# Patient Record
Sex: MALE | Race: NATIVE HAWAIIAN OR OTHER PACIFIC ISLANDER | Employment: FULL TIME | URBAN - METROPOLITAN AREA
[De-identification: names, ages, dates, MRNs, and addresses within clinical notes are randomized per-mention and may not be internally consistent; named-entity substitution may affect disease eponyms.]

---

## 2017-04-12 ENCOUNTER — OFFICE VISIT (OUTPATIENT)
Dept: FAMILY MEDICINE | Facility: CLINIC | Age: 37
End: 2017-04-12
Payer: MEDICAID

## 2017-04-12 VITALS
TEMPERATURE: 97.6 F | HEIGHT: 66 IN | DIASTOLIC BLOOD PRESSURE: 80 MMHG | BODY MASS INDEX: 30.7 KG/M2 | WEIGHT: 191 LBS | SYSTOLIC BLOOD PRESSURE: 134 MMHG | HEART RATE: 64 BPM

## 2017-04-12 DIAGNOSIS — E78.5 HYPERLIPIDEMIA LDL GOAL <130: ICD-10-CM

## 2017-04-12 DIAGNOSIS — E11.9 TYPE 2 DIABETES MELLITUS WITHOUT COMPLICATION, WITHOUT LONG-TERM CURRENT USE OF INSULIN (H): ICD-10-CM

## 2017-04-12 DIAGNOSIS — J30.2 SEASONAL ALLERGIC RHINITIS, UNSPECIFIED ALLERGIC RHINITIS TRIGGER: ICD-10-CM

## 2017-04-12 DIAGNOSIS — I10 HYPERTENSION GOAL BP (BLOOD PRESSURE) < 140/80: ICD-10-CM

## 2017-04-12 DIAGNOSIS — Z00.00 ROUTINE GENERAL MEDICAL EXAMINATION AT A HEALTH CARE FACILITY: Primary | ICD-10-CM

## 2017-04-12 DIAGNOSIS — E55.9 VITAMIN D DEFICIENCY: ICD-10-CM

## 2017-04-12 DIAGNOSIS — Z13.89 SCREENING FOR DIABETIC PERIPHERAL NEUROPATHY: ICD-10-CM

## 2017-04-12 LAB
ALBUMIN SERPL-MCNC: 3.6 G/DL (ref 3.4–5)
ALBUMIN UR-MCNC: NEGATIVE MG/DL
ALP SERPL-CCNC: 71 U/L (ref 40–150)
ALT SERPL W P-5'-P-CCNC: 21 U/L (ref 0–70)
ANION GAP SERPL CALCULATED.3IONS-SCNC: 7 MMOL/L (ref 3–14)
APPEARANCE UR: CLEAR
AST SERPL W P-5'-P-CCNC: 12 U/L (ref 0–45)
BILIRUB SERPL-MCNC: 0.3 MG/DL (ref 0.2–1.3)
BILIRUB UR QL STRIP: NEGATIVE
BUN SERPL-MCNC: 13 MG/DL (ref 7–30)
CALCIUM SERPL-MCNC: 9.1 MG/DL (ref 8.5–10.1)
CHLORIDE SERPL-SCNC: 108 MMOL/L (ref 94–109)
CHOLEST SERPL-MCNC: 149 MG/DL
CO2 SERPL-SCNC: 27 MMOL/L (ref 20–32)
COLOR UR AUTO: YELLOW
CREAT SERPL-MCNC: 0.8 MG/DL (ref 0.66–1.25)
CREAT UR-MCNC: 182 MG/DL
GFR SERPL CREATININE-BSD FRML MDRD: ABNORMAL ML/MIN/1.7M2
GLUCOSE SERPL-MCNC: 150 MG/DL (ref 70–99)
GLUCOSE UR STRIP-MCNC: NEGATIVE MG/DL
HBA1C MFR BLD: 6.6 % (ref 4.3–6)
HDLC SERPL-MCNC: 42 MG/DL
HGB UR QL STRIP: NEGATIVE
KETONES UR STRIP-MCNC: NEGATIVE MG/DL
LDLC SERPL CALC-MCNC: 89 MG/DL
LEUKOCYTE ESTERASE UR QL STRIP: NEGATIVE
MICROALBUMIN UR-MCNC: 10 MG/L
MICROALBUMIN/CREAT UR: 5.77 MG/G CR (ref 0–17)
NITRATE UR QL: NEGATIVE
NONHDLC SERPL-MCNC: 107 MG/DL
PH UR STRIP: 5.5 PH (ref 5–7)
POTASSIUM SERPL-SCNC: 4.1 MMOL/L (ref 3.4–5.3)
PROT SERPL-MCNC: 7.7 G/DL (ref 6.8–8.8)
SODIUM SERPL-SCNC: 142 MMOL/L (ref 133–144)
SP GR UR STRIP: 1.02 (ref 1–1.03)
TRIGL SERPL-MCNC: 90 MG/DL
URN SPEC COLLECT METH UR: NORMAL
UROBILINOGEN UR STRIP-ACNC: 0.2 EU/DL (ref 0.2–1)

## 2017-04-12 PROCEDURE — 82043 UR ALBUMIN QUANTITATIVE: CPT | Performed by: FAMILY MEDICINE

## 2017-04-12 PROCEDURE — 83036 HEMOGLOBIN GLYCOSYLATED A1C: CPT | Performed by: FAMILY MEDICINE

## 2017-04-12 PROCEDURE — 99395 PREV VISIT EST AGE 18-39: CPT | Mod: 25 | Performed by: FAMILY MEDICINE

## 2017-04-12 PROCEDURE — 80061 LIPID PANEL: CPT | Performed by: FAMILY MEDICINE

## 2017-04-12 PROCEDURE — 99212 OFFICE O/P EST SF 10 MIN: CPT | Mod: 25 | Performed by: FAMILY MEDICINE

## 2017-04-12 PROCEDURE — 82306 VITAMIN D 25 HYDROXY: CPT | Performed by: FAMILY MEDICINE

## 2017-04-12 PROCEDURE — 99207 C FOOT EXAM  NO CHARGE: CPT | Mod: 25 | Performed by: FAMILY MEDICINE

## 2017-04-12 PROCEDURE — 80053 COMPREHEN METABOLIC PANEL: CPT | Performed by: FAMILY MEDICINE

## 2017-04-12 PROCEDURE — 81003 URINALYSIS AUTO W/O SCOPE: CPT | Performed by: FAMILY MEDICINE

## 2017-04-12 PROCEDURE — 36415 COLL VENOUS BLD VENIPUNCTURE: CPT | Performed by: FAMILY MEDICINE

## 2017-04-12 NOTE — MR AVS SNAPSHOT
After Visit Summary   4/12/2017    Abdi Ndiaye    MRN: [id number]           Patient Information     Date Of Birth          1980        Visit Information        Provider Department      4/12/2017 9:00 AM Isac Hare MD [facility]        Today's Diagnoses     Routine general medical examination at a health care facility    -  1    Screening for diabetic peripheral neuropathy        Hypertension goal BP (blood pressure) < 140/80        Type 2 diabetes mellitus without complication, without long-term current use of insulin (H)        Vitamin D deficiency        Seasonal allergic rhinitis, unspecified allergic rhinitis trigger        Hyperlipidemia LDL goal <130          Care Instructions      Preventive Health Recommendations  Male Ages 26 - 39    Yearly exam:             See your health care provider every year in order to  o   Review health changes.   o   Discuss preventive care.    o   Review your medicines if your doctor has prescribed any.    You should be tested each year for STDs (sexually transmitted diseases), if you re at risk.     After age 35, talk to your provider about cholesterol testing. If you are at risk for heart disease, have your cholesterol tested at least every 5 years.     If you are at risk for diabetes, you should have a diabetes test (fasting glucose).  Shots: Get a flu shot each year. Get a tetanus shot every 10 years.     Nutrition:    Eat at least 5 servings of fruits and vegetables daily.     Eat whole-grain bread, whole-wheat pasta and brown rice instead of white grains and rice.     Talk to your provider about Calcium and Vitamin D.     Lifestyle    Exercise for at least 150 minutes a week (30 minutes a day, 5 days a week). This will help you control your weight and prevent disease.     Limit alcohol to one drink per day.     No smoking.     Wear sunscreen to prevent skin cancer.     See your dentist every six months for an exam and cleaning.  "          Follow-ups after your visit        Who to contact     If you have questions or need follow up information about today's clinic visit or your schedule please contact [facility] [place] directly at [phone].  Normal or non-critical lab and imaging results will be communicated to you by MyChart, letter or phone within 4 business days after the clinic has received the results. If you do not hear from us within 7 days, please contact the clinic through MyChart or phone. If you have a critical or abnormal lab result, we will notify you by phone as soon as possible.  Submit refill requests through avandeo or call your pharmacy and they will forward the refill request to us. Please allow 3 business days for your refill to be completed.          Additional Information About Your Visit        Care EveryWhere ID     This is your Care EveryWhere ID. This could be used by other organizations to access your [facility] medical records  [id number]        Your Vitals Were     Pulse Temperature Height BMI (Body Mass Index)          64 97.6  F (36.4  C) (Tympanic) 5' 6\" (1.676 m) 30.83 kg/m2         Blood Pressure from Last 3 Encounters:   04/12/17 134/80   10/28/15 (!) 138/96   09/23/15 118/80    Weight from Last 3 Encounters:   04/12/17 191 lb (86.6 kg)   10/28/15 189 lb (85.7 kg)   09/23/15 186 lb (84.4 kg)              We Performed the Following     Albumin Random Urine Quantitative     Comprehensive metabolic panel     FOOT EXAM  NO CHARGE [26177.114]     HEMOGLOBIN A1C     Lipid panel reflex to direct LDL     UA reflex to Microscopic     Vitamin D Deficiency          Today's Medication Changes          These changes are accurate as of: 4/12/17  9:36 AM.  If you have any questions, ask your nurse or doctor.               Start taking these medicines.        Dose/Directions    metFORMIN 500 MG tablet   Commonly known as:  GLUCOPHAGE   Used for:  Type 2 diabetes mellitus without complication, without " long-term current use of insulin (H)   Started by:  Isac Hare MD        Dose:  500 mg   Take 1 tablet (500 mg) by mouth 2 times daily (with meals)   Quantity:  60 tablet   Refills:  5            Where to get your medicines      These medications were sent to [facility] [place] - [place], [place] - [address]  [address], [place] [address]     Phone:  [phone]     metFORMIN 500 MG tablet                Primary Care Provider Office Phone # Fax #    Isac Hare [phone]       [facility] [address] [place] [address] [address]  [place] [address]        Thank you!     Thank you for choosing [facility]  for your care. Our goal is always to provide you with excellent care. Hearing back from our patients is one way we can continue to improve our services. Please take a few minutes to complete the written survey that you may receive in the mail after your visit with us. Thank you!             Your Updated Medication List - Protect others around you: Learn how to safely use, store and throw away your medicines at www.disposemymeds.org.          This list is accurate as of: 4/12/17  9:36 AM.  Always use your most recent med list.                   Brand Name Dispense Instructions for use    metFORMIN 500 MG tablet    GLUCOPHAGE    60 tablet    Take 1 tablet (500 mg) by mouth 2 times daily (with meals)

## 2017-04-12 NOTE — LETTER
[facility]   [address] [place] [address]   [phone]      April 13, 2017      Abdi Ndiaye  [address]  [place] [address]            Dear Abdi,      I have reviewed your recent labs. Here are the results:     -Liver and gallbladder tests (ALT,AST, Alk phos,bilirubin) are normal.   -Kidney function (GFR) is normal.   -Sodium is normal.   -Potassium is normal.       -Cholesterol levels (LDL,HDL, Triglycerides) are normal.  ADVISE: rechecking in 1 year.   -A1C test (average blood sugar the last 2-3 months) is above your goal.   ADVISE:  Diabetic followup appointment in 4 weeks. Please check and record your blood sugars at least 2 times daily for 1 week prior to your appointment and bring for review.  Also, recheck your A1C in 3 months too. (A1C, DX: diabetes)     -Vitamin D level is low and oral supplementation should be started.  ADVISE: starting over the counter Vitamin D3  2000 IU - 3 tabs (6000 IU) daily for 6 weeks and then 2000 IU daily to maintain levels.  In 2 months, please schedule a lab only appointment to recheck Vitamin D levels    -Microalbumin (urine protein) test is normal.  ADVISE: recheck annually     If you have any questions or concerns, please call the clinic at [phone] and make a follow up appointment with me.    Results for orders placed or performed in visit on 04/12/17   HEMOGLOBIN A1C   Result Value Ref Range    Hemoglobin A1C 6.6 (H) 4.3 - 6.0 %   Lipid panel reflex to direct LDL   Result Value Ref Range    Cholesterol 149 <200 mg/dL    Triglycerides 90 <150 mg/dL    HDL Cholesterol 42 >39 mg/dL    LDL Cholesterol Calculated 89 <100 mg/dL    Non HDL Cholesterol 107 <130 mg/dL   Albumin Random Urine Quantitative   Result Value Ref Range    Creatinine Urine 182 mg/dL    Albumin Urine mg/L 10 mg/L    Albumin Urine mg/g Cr 5.77 0 - 17 mg/g Cr   Comprehensive metabolic panel   Result Value Ref Range    Sodium 142 133 - 144 mmol/L     Potassium 4.1 3.4 - 5.3 mmol/L    Chloride 108 94 - 109 mmol/L    Carbon Dioxide 27 20 - 32 mmol/L    Anion Gap 7 3 - 14 mmol/L    Glucose 150 (H) 70 - 99 mg/dL    Urea Nitrogen 13 7 - 30 mg/dL    Creatinine 0.80 0.66 - 1.25 mg/dL    GFR Estimate >90  Non  GFR Calc   >60 mL/min/1.7m2    GFR Estimate If Black >90   GFR Calc   >60 mL/min/1.7m2    Calcium 9.1 8.5 - 10.1 mg/dL    Bilirubin Total 0.3 0.2 - 1.3 mg/dL    Albumin 3.6 3.4 - 5.0 g/dL    Protein Total 7.7 6.8 - 8.8 g/dL    Alkaline Phosphatase 71 40 - 150 U/L    ALT 21 0 - 70 U/L    AST 12 0 - 45 U/L   Vitamin D Deficiency   Result Value Ref Range    Vitamin D Deficiency screening 14 (L) 20 - 75 ug/L   UA reflex to Microscopic   Result Value Ref Range    Color Urine Yellow     Appearance Urine Clear     Glucose Urine Negative NEG mg/dL    Bilirubin Urine Negative NEG    Ketones Urine Negative NEG mg/dL    Specific Gravity Urine 1.025 1.003 - 1.035    Blood Urine Negative NEG    pH Urine 5.5 5.0 - 7.0 pH    Protein Albumin Urine Negative NEG mg/dL    Urobilinogen Urine 0.2 0.2 - 1.0 EU/dL    Nitrite Urine Negative NEG    Leukocyte Esterase Urine Negative NEG    Source Midstream Urine        Sincerely,    Isac Hare M.D.

## 2017-04-12 NOTE — NURSING NOTE
"Chief Complaint   Patient presents with     Physical     Fasting        Initial /80  Pulse 64  Temp 97.6  F (36.4  C) (Tympanic)  Ht 5' 6\" (1.676 m)  Wt 191 lb (86.6 kg)  BMI 30.83 kg/m2 Estimated body mass index is 30.83 kg/(m^2) as calculated from the following:    Height as of this encounter: 5' 6\" (1.676 m).    Weight as of this encounter: 191 lb (86.6 kg).  Medication Reconciliation: complete    Current Outpatient Prescriptions   Medication Sig Dispense Refill     [DISCONTINUED] ORDER FOR DME COUNTOUR NEXT BLOOD STRIPS    Use to test blood sugars 2x daily or as directed 100 strip prn       Zoran VAZQUEZ CMA  "

## 2017-04-12 NOTE — PROGRESS NOTES
SUBJECTIVE:     CC: Abdi Ndiaye is an 37 year old male who presents for preventative health visit.     Healthy Habits:    Do you get at least three servings of calcium containing foods daily (dairy, green leafy vegetables, etc.)? yes    Amount of exercise or daily activities, outside of work: NONE    Problems taking medications regularly not applicable    Medication side effects: No    Have you had an eye exam in the past two years? no    Do you see a dentist twice per year? yes    Do you have sleep apnea, excessive snoring or daytime drowsiness?no        He has history of diabetes but its diet controled. He was in [place] for [place] year with his children's. Denies nay medical issue while back in [place].  Some time complains of headache.  Over all feels good.    Today's PHQ-2 Score:   PHQ-2 ( 1999 [employer]) 4/12/2017 10/28/2015   Q1: Little interest or pleasure in doing things 0 0   Q2: Feeling down, depressed or hopeless 0 0   PHQ-2 Score 0 0       Abuse: Current or Past(Physical, Sexual or Emotional)- No  Do you feel safe in your environment - Yes    Social History   Substance Use Topics     Smoking status: Never Smoker     Smokeless tobacco: Never Used     Alcohol use No     The patient does not drink >3 drinks per day nor >7 drinks per week.    Last PSA: No results found for: PSA    Recent Labs   Lab Test  12/10/14   0907  08/08/14   1519   CHOL  165  143   HDL  43  45   LDL  99  79   TRIG  116  97   CHOLHDLRATIO  3.8  3.2       Reviewed orders with patient. Reviewed health maintenance and updated orders accordingly - Yes    Reviewed and updated as needed this visit by clinical staff  Tobacco  Allergies  Meds  Soc Hx        Reviewed and updated as needed this visit by Provider          ROS:  C: NEGATIVE for fever, chills, change in weight  I: NEGATIVE for worrisome rashes, moles or lesions  E: NEGATIVE for vision changes or irritation  ENT: NEGATIVE for ear, mouth and throat problems  R: NEGATIVE for significant  "cough or SOB  CV: NEGATIVE for chest pain, palpitations or peripheral edema  GI: NEGATIVE for nausea, abdominal pain, heartburn, or change in bowel habits   male: negative for dysuria, hematuria, decreased urinary stream, erectile dysfunction, urethral discharge  M: NEGATIVE for significant arthralgias or myalgia  N: NEGATIVE for weakness, dizziness or paresthesias  P: NEGATIVE for changes in mood or affect    Problem list, Medication list, Allergies, and Medical/Social/Surgical histories reviewed in [facility] and updated as appropriate.  Labs reviewed in EPIC  BP Readings from Last 3 Encounters:   04/12/17 134/80   10/28/15 (!) 138/96   09/23/15 118/80    Wt Readings from Last 3 Encounters:   04/12/17 191 lb (86.6 kg)   10/28/15 189 lb (85.7 kg)   09/23/15 186 lb (84.4 kg)                  Patient Active Problem List   Diagnosis     Nonspecific reaction to tuberculin skin test without active tuberculosis     Chronic rhinitis     Vitamin D deficiency     Seasonal allergic rhinitis     Hypertension goal BP (blood pressure) < 140/80     Type 2 diabetes mellitus without complication, without long-term current use of insulin (H)     Past Surgical History:   Procedure Laterality Date     NO HISTORY OF SURGERY         Social History   Substance Use Topics     Smoking status: Never Smoker     Smokeless tobacco: Never Used     Alcohol use No     Family History   Problem Relation Age of Onset     C.A.D. No family hx of      DIABETES No family hx of      Hypertension No family hx of      Cancer - colorectal No family hx of      Prostate Cancer No family hx of      Eye Disorder No family hx of      Thyroid Disease No family hx of      Anesthesia Reaction No family hx of      Blood Disease No family hx of          OBJECTIVE:     /80  Pulse 64  Temp 97.6  F (36.4  C) (Tympanic)  Ht 5' 6\" (1.676 m)  Wt 191 lb (86.6 kg)  BMI 30.83 kg/m2  EXAM:  GENERAL: healthy, alert and no distress  EYES: Eyes grossly normal to " inspection, PERRL and conjunctivae and sclerae normal  HENT: ear canals and TM's normal, nose and mouth without ulcers or lesions  NECK: no adenopathy, no asymmetry, masses, or scars and thyroid normal to palpation  RESP: lungs clear to auscultation - no rales, rhonchi or wheezes  CV: regular rate and rhythm, normal S1 S2, no S3 or S4, no murmur, click or rub, no peripheral edema and peripheral pulses strong  ABDOMEN: soft, nontender, no hepatosplenomegaly, no masses and bowel sounds normal  MS: no gross musculoskeletal defects noted, no edema  SKIN: no suspicious lesions or rashes  NEURO: Normal strength and tone, mentation intact and speech normal  PSYCH: mentation appears normal, affect normal/bright    ASSESSMENT/PLAN:     1. Routine general medical examination at a health care facility    - FOOT EXAM  NO CHARGE [85546.114]  - HEMOGLOBIN A1C  - Lipid panel reflex to direct LDL  - Albumin Random Urine Quantitative  - Comprehensive metabolic panel  - Vitamin D Deficiency  - UA reflex to Microscopic    2. Screening for diabetic peripheral neuropathy    - FOOT EXAM  NO CHARGE [04252.114]    3. Hypertension goal BP (blood pressure) < 140/80      4. Type 2 diabetes mellitus without complication, without long-term current use of insulin (H)  Patient AIC is elevated now at 6.6, we dicussed low dose of metformin 500 mg BID, take 1 tab for the first 1 week. Eat healthy, Avoid carb's. Loose some weight. Follow up in 3 months for recheck, we will reevaluate and see if any dose adjustments are needed.  - FOOT EXAM  NO CHARGE [05563.114]  - HEMOGLOBIN A1C  - Comprehensive metabolic panel  - UA reflex to Microscopic  - metFORMIN (GLUCOPHAGE) 500 MG tablet; Take 1 tablet (500 mg) by mouth 2 times daily (with meals)  Dispense: 60 tablet; Refill: 5    5. Vitamin D deficiency    - Vitamin D Deficiency    6. Seasonal allergic rhinitis, unspecified allergic rhinitis trigger      7. Hyperlipidemia LDL goal <130    - Lipid panel  "reflex to direct LDL  - Comprehensive metabolic panel    COUNSELING:  Reviewed preventive health counseling, as reflected in patient instructions       Regular exercise       Healthy diet/nutrition         reports that he has never smoked. He has never used smokeless tobacco.       Estimated body mass index is 30.83 kg/(m^2) as calculated from the following:    Height as of this encounter: 5' 6\" (1.676 m).    Weight as of this encounter: 191 lb (86.6 kg).   Weight management plan: Discussed healthy diet and exercise guidelines and patient will follow up in 6 months in clinic to re-evaluate.    Counseling Resources:  ATP IV Guidelines  Pooled Cohorts Equation Calculator  FRAX Risk Assessment  ICSI Preventive Guidelines  Dietary Guidelines for Americans, 2010  USDA's MyPlate  ASA Prophylaxis  Lung CA Screening    Isac Hare MD  [facility]  "

## 2017-04-13 LAB — DEPRECATED CALCIDIOL+CALCIFEROL SERPL-MC: 14 UG/L (ref 20–75)

## 2017-04-17 DIAGNOSIS — E11.9 TYPE 2 DIABETES MELLITUS WITHOUT COMPLICATION, WITHOUT LONG-TERM CURRENT USE OF INSULIN (H): Primary | ICD-10-CM

## 2017-04-17 NOTE — TELEPHONE ENCOUNTER
Shazia Contour Meter and Shazia Microlet Lancets      Last Written Prescription Date: 00/00/0000  Last Fill Quantity: 0,  # refills: 0   Last Office Visit with [facility], [facility] or [facility] prescribing provider: 04/12/17    Patient was prescribed Shazia Contour test strips, but he needs the meter and lancets also.  Please send rx for these.    Thank You  Caren Jeff, [facility] [place]

## 2017-04-26 DIAGNOSIS — R10.13 ABDOMINAL PAIN, EPIGASTRIC: ICD-10-CM

## 2017-04-26 RX ORDER — NICOTINE POLACRILEX 4 MG/1
20 GUM, CHEWING ORAL DAILY
Qty: 30 TABLET | Refills: 3 | Status: SHIPPED | OUTPATIENT
Start: 2017-04-26 | End: 2017-08-24

## 2017-04-26 NOTE — TELEPHONE ENCOUNTER
Patient calling to see if this med along with the request for test strips/meter (see separate request) can be sent to his pharmacy  He is out  Radha ARROYO

## 2017-04-26 NOTE — TELEPHONE ENCOUNTER
Omeprazole       Last Written Prescription Date:  00/00/17  Last Fill Quantity: 0,   # refills: 0  Last Office Visit with G, P or [facility] prescribing provider: 04/12/17  Future Office visit:       Routing refill request to provider for review/approval because:  Medication is reported/historical    Patient requested this medication.     Thank You  Caren Jeff [facility]

## 2017-04-26 NOTE — TELEPHONE ENCOUNTER
Routing refill request to provider for review/approval because:  Drug not active on patient's medication list    Berenice Wills RN  [facility]  [phone]

## 2017-06-01 ENCOUNTER — TRANSFERRED RECORDS (OUTPATIENT)
Dept: HEALTH INFORMATION MANAGEMENT | Facility: CLINIC | Age: 37
End: 2017-06-01

## 2017-08-24 ENCOUNTER — OFFICE VISIT (OUTPATIENT)
Dept: FAMILY MEDICINE | Facility: CLINIC | Age: 37
End: 2017-08-24
Payer: COMMERCIAL

## 2017-08-24 VITALS
BODY MASS INDEX: 29.54 KG/M2 | OXYGEN SATURATION: 99 % | WEIGHT: 183 LBS | HEART RATE: 58 BPM | SYSTOLIC BLOOD PRESSURE: 136 MMHG | TEMPERATURE: 96.9 F | DIASTOLIC BLOOD PRESSURE: 84 MMHG

## 2017-08-24 DIAGNOSIS — K21.9 GASTROESOPHAGEAL REFLUX DISEASE WITHOUT ESOPHAGITIS: ICD-10-CM

## 2017-08-24 DIAGNOSIS — I10 HYPERTENSION GOAL BP (BLOOD PRESSURE) < 140/80: Primary | ICD-10-CM

## 2017-08-24 DIAGNOSIS — E11.9 TYPE 2 DIABETES MELLITUS WITHOUT COMPLICATION, WITHOUT LONG-TERM CURRENT USE OF INSULIN (H): ICD-10-CM

## 2017-08-24 LAB
HBA1C MFR BLD: 6.2 % (ref 4.3–6)
TSH SERPL DL<=0.005 MIU/L-ACNC: 2.51 MU/L (ref 0.4–4)

## 2017-08-24 PROCEDURE — 83036 HEMOGLOBIN GLYCOSYLATED A1C: CPT | Performed by: FAMILY MEDICINE

## 2017-08-24 PROCEDURE — 36415 COLL VENOUS BLD VENIPUNCTURE: CPT | Performed by: FAMILY MEDICINE

## 2017-08-24 PROCEDURE — 99214 OFFICE O/P EST MOD 30 MIN: CPT | Performed by: FAMILY MEDICINE

## 2017-08-24 PROCEDURE — 84443 ASSAY THYROID STIM HORMONE: CPT | Performed by: FAMILY MEDICINE

## 2017-08-24 RX ORDER — NICOTINE POLACRILEX 4 MG/1
20 GUM, CHEWING ORAL DAILY
Qty: 30 TABLET | Refills: 3 | Status: SHIPPED | OUTPATIENT
Start: 2017-08-24 | End: 2018-08-02

## 2017-08-24 RX ORDER — NICOTINE POLACRILEX 4 MG/1
20 GUM, CHEWING ORAL DAILY
Qty: 30 TABLET | Refills: 3 | Status: CANCELLED | OUTPATIENT
Start: 2017-08-24

## 2017-08-24 NOTE — LETTER
August 24, 2017      Zitashawn Jamesor  [address]  [place] [address]        Dear ,      I have reviewed your recent labs. Here are the results:     -TSH (thyroid stimulating hormone) level is normal which indicates normal thyroid function.   -A1C (test of diabetes control the last 2-3 months) is at your goal. Please continue with current plan. Also, see me and recheck your A1C test in 6 months.     Resulted Orders   TSH WITH FREE T4 REFLEX   Result Value Ref Range    TSH 2.51 0.40 - 4.00 mU/L   Hemoglobin A1c   Result Value Ref Range    Hemoglobin A1C 6.2 (H) 4.3 - 6.0 %       If you have any questions or concerns, please call the clinic at the number listed above.       Sincerely,    Isac Hare MD

## 2017-08-24 NOTE — NURSING NOTE
"Chief Complaint   Patient presents with     Diabetes     Fasting        Initial /80  Pulse 58  Temp 96.9  F (36.1  C) (Tympanic)  Wt 183 lb (83 kg)  SpO2 99%  BMI 29.54 kg/m2 Estimated body mass index is 29.54 kg/(m^2) as calculated from the following:    Height as of 4/12/17: 5' 6\" (1.676 m).    Weight as of this encounter: 183 lb (83 kg).  Medication Reconciliation: complete    Current Outpatient Prescriptions   Medication Sig Dispense Refill     omeprazole 20 MG tablet Take 1 tablet (20 mg) by mouth daily Take 30-60 minutes before a meal. 30 tablet 3     metFORMIN (GLUCOPHAGE) 500 MG tablet Take 1 tablet (500 mg) by mouth 2 times daily (with meals) 60 tablet 5     blood glucose monitoring (HÉCTOR CONTOUR) test strip Use to test blood sugars 1 times daily or as directed. 100 strip 11     blood glucose monitoring (HÉCTOR MICROLET) lancets Use to test blood sugar 1 times daily or as directed.  Ok to substitute alternative if insurance prefers. 1 Box prn     blood glucose monitoring (NO BRAND SPECIFIED) meter device kit Use to test blood sugar 1 times daily or as directed. 1 kit 0     [DISCONTINUED] ORDER FOR DME COUNTOUR NEXT BLOOD STRIPS    Use to test blood sugars 2x daily or as directed 100 strip prn       Zoran VAZQUEZ CMA  "

## 2017-08-24 NOTE — PROGRESS NOTES
"  SUBJECTIVE:   Abdi Ndiaye is a 37 year old male who presents to clinic today for the following health issues:      Diabetes Follow-up  No side effects of the medications.  Last AIC is 6.6  Patient is checking blood sugars: twice daily.    Blood sugar testing frequency justification: Patient modifying lifestyle changes (diet, exercise) with blood sugars  Results are as follows:       am - 100-110        Diabetic concerns: None     Symptoms of hypoglycemia (low blood sugar): none     Paresthesias (numbness or burning in feet) or sores: Yes \"feels hot\"   Date of last diabetic eye exam: 2017      Amount of exercise or physical activity: walking 7 days per week     Problems taking medications regularly: No    Medication side effects: none  Diet: low sugar         Problem list and histories reviewed & adjusted, as indicated.  Additional history: as documented    Patient Active Problem List   Diagnosis     Nonspecific reaction to tuberculin skin test without active tuberculosis     Chronic rhinitis     Vitamin D deficiency     Seasonal allergic rhinitis     Hypertension goal BP (blood pressure) < 140/80     Type 2 diabetes mellitus without complication, without long-term current use of insulin (H)     Past Surgical History:   Procedure Laterality Date     NO HISTORY OF SURGERY         Social History   Substance Use Topics     Smoking status: Never Smoker     Smokeless tobacco: Never Used     Alcohol use No     Family History   Problem Relation Age of Onset     C.A.D. No family hx of      DIABETES No family hx of      Hypertension No family hx of      Cancer - colorectal No family hx of      Prostate Cancer No family hx of      Eye Disorder No family hx of      Thyroid Disease No family hx of      Anesthesia Reaction No family hx of      Blood Disease No family hx of          Current Outpatient Prescriptions   Medication Sig Dispense Refill     metFORMIN (GLUCOPHAGE) 500 MG tablet Take 1 tablet (500 mg) by mouth 2 times " daily (with meals) 60 tablet 8     omeprazole 20 MG tablet Take 1 tablet (20 mg) by mouth daily Take 30-60 minutes before a meal. 30 tablet 3     blood glucose monitoring (HÉCTOR CONTOUR) test strip Use to test blood sugars 1 times daily or as directed. 100 strip 11     blood glucose monitoring (HÉCTOR MICROLET) lancets Use to test blood sugar 1 times daily or as directed.  Ok to substitute alternative if insurance prefers. 1 Box prn     blood glucose monitoring (NO BRAND SPECIFIED) meter device kit Use to test blood sugar 1 times daily or as directed. 1 kit 0     [DISCONTINUED] metFORMIN (GLUCOPHAGE) 500 MG tablet Take 1 tablet (500 mg) by mouth 2 times daily (with meals) 60 tablet 5     [DISCONTINUED] ORDER FOR DME COUNTOUR NEXT BLOOD STRIPS    Use to test blood sugars 2x daily or as directed 100 strip prn         Reviewed and updated as needed this visit by clinical staffTobacco  Allergies  Meds       Reviewed and updated as needed this visit by Provider         ROS:  C: NEGATIVE for fever, chills, change in weight  E/M: NEGATIVE for ear, mouth and throat problems  R: NEGATIVE for significant cough or SOB  CV: NEGATIVE for chest pain, palpitations or peripheral edema    OBJECTIVE:                                                    /84  Pulse 58  Temp 96.9  F (36.1  C) (Tympanic)  Wt 183 lb (83 kg)  SpO2 99%  BMI 29.54 kg/m2  Body mass index is 29.54 kg/(m^2).   GENERAL: healthy, alert, well nourished, well hydrated, no distress  HENT: ear canals- normal; TMs- normal; Nose- normal; Mouth- no ulcers, no lesions  NECK: no tenderness, no adenopathy, no asymmetry, no masses, no stiffness; thyroid- normal to palpation  RESP: lungs clear to auscultation - no rales, no rhonchi, no wheezes  CV: regular rates and rhythm, normal S1 S2, no S3 or S4 and no murmur, no click or rub -  ABDOMEN: soft, no tenderness, no  hepatosplenomegaly, no masses, normal bowel sounds       ASSESSMENT/PLAN:                                                         ICD-10-CM    1. Hypertension goal BP (blood pressure) < 140/80 I10    2. Type 2 diabetes mellitus without complication, without long-term current use of insulin (H) E11.9 TSH WITH FREE T4 REFLEX     Hemoglobin A1c     metFORMIN (GLUCOPHAGE) 500 MG tablet   3. Gastroesophageal reflux disease without esophagitis K21.9 omeprazole 20 MG tablet       AIc is well controled. We will continue the dose of  Metformin 500 mg BID. Continue to work on diet and do regular exercise.  BP recheck was better, advice low salt diet.  Omeprazole refilled.  Follow up in April-2018 for recheck and physical.    Isac Hare MD  [facility] [place]

## 2017-08-24 NOTE — MR AVS SNAPSHOT
"              After Visit Summary   2017    Abdi Ndiaye    MRN: [id number]           Patient Information     Date Of Birth          1980        Visit Information        Provider Department      2017 8:20 AM [facility]        Today's Diagnoses     Hypertension goal BP (blood pressure) < 140/80    -  1    Type 2 diabetes mellitus without complication, without long-term current use of insulin (H)        Gastroesophageal reflux disease without esophagitis           Follow-ups after your visit        Who to contact     If you have questions or need follow up information about today's clinic visit or your schedule please contact [facility] directly at [phone].  Normal or non-critical lab and imaging results will be communicated to you by MyChart, letter or phone within 4 business days after the clinic has received the results. If you do not hear from us within 7 days, please contact the clinic through [employer] or phone. If you have a critical or abnormal lab result, we will notify you by phone as soon as possible.  Submit refill requests through [employer] or call your pharmacy and they will forward the refill request to us. Please allow 3 business days for your refill to be completed.          Additional Information About Your Visit        MyChart Information     [employer] lets you send messages to your doctor, view your test results, renew your prescriptions, schedule appointments and more. To sign up, go to [email or web address] . Click on \"Log in\" on the left side of the screen, which will take you to the Welcome page. Then click on \"Sign up Now\" on the right side of the page.     You will be asked to enter the access code listed below, as well as some personal information. Please follow the directions to create your username and password.     Your access code is: [email or web address]  Expires: 2017  8:31 AM     Your access code will  in 90 " days. If you need help or a new code, please call your [facility] clinic or [phone].        Care EveryWhere ID     This is your Care EveryWhere ID. This could be used by other organizations to access your [facility] medical records  [id number]        Your Vitals Were     Pulse Temperature Pulse Oximetry BMI (Body Mass Index)          58 96.9  F (36.1  C) (Tympanic) 99% 29.54 kg/m2         Blood Pressure from Last 3 Encounters:   08/24/17 136/84   04/12/17 134/80   10/28/15 (!) 138/96    Weight from Last 3 Encounters:   08/24/17 183 lb (83 kg)   04/12/17 191 lb (86.6 kg)   10/28/15 189 lb (85.7 kg)              We Performed the Following     Hemoglobin A1c     TSH WITH FREE T4 REFLEX          Where to get your medicines      These medications were sent to [facility] [place] [address]  [address] [place] [address], [place] [address]     Phone:  [phone]     metFORMIN 500 MG tablet    omeprazole 20 MG tablet          Primary Care Provider Office Phone # Fax #    Isac Hare [phone]       [address] [place] [address] [address]  [place] [address]        Equal Access to Services     [facility] : Hadii deonte ku hadasho Soomaali, waaxda luqadaha, qaybta kaalmada adeegyada, waxhernandez stevens haychi sheppard . So [facility] [phone].    ATENCIÓN: Si habla español, tiene a rodriguez disposición servicios gratuitos de asistencia lingüística. Llame al [phone].    We comply with applicable federal civil rights laws and [place] laws. We do not discriminate on the basis of race, color, national origin, age, disability sex, sexual orientation or gender identity.            Thank you!     Thank you for choosing [facility] [place]  for your care. Our goal is always to provide you with excellent care. Hearing back from our patients is one way we can continue to improve our services. Please take a few minutes to complete the written survey that you may  receive in the mail after your visit with us. Thank you!             Your Updated Medication List - Protect others around you: Learn how to safely use, store and throw away your medicines at www.disposemymeds.org.          This list is accurate as of: 8/24/17  8:31 AM.  Always use your most recent med list.                   Brand Name Dispense Instructions for use Diagnosis    blood glucose monitoring lancets     1 Box    Use to test blood sugar 1 times daily or as directed.  Ok to substitute alternative if insurance prefers.    Type 2 diabetes mellitus without complication, without long-term current use of insulin (H)       blood glucose monitoring meter device kit    no brand specified    1 kit    Use to test blood sugar 1 times daily or as directed.    Type 2 diabetes mellitus without complication, without long-term current use of insulin (H)       blood glucose monitoring test strip    HÉCTOR CONTOUR    100 strip    Use to test blood sugars 1 times daily or as directed.    Type 2 diabetes mellitus without complication, without long-term current use of insulin (H)       metFORMIN 500 MG tablet    GLUCOPHAGE    60 tablet    Take 1 tablet (500 mg) by mouth 2 times daily (with meals)    Type 2 diabetes mellitus without complication, without long-term current use of insulin (H)       omeprazole 20 MG tablet     30 tablet    Take 1 tablet (20 mg) by mouth daily Take 30-60 minutes before a meal.    Gastroesophageal reflux disease without esophagitis

## 2017-08-24 NOTE — Clinical Note
Please abstract the following data from this visit with this patient into the appropriate field in Epic:  Eye exam with ophthalmology on this date: June 2017 [facility]

## 2018-03-13 ENCOUNTER — OFFICE VISIT (OUTPATIENT)
Dept: FAMILY MEDICINE | Facility: CLINIC | Age: 38
End: 2018-03-13
Payer: COMMERCIAL

## 2018-03-13 VITALS
BODY MASS INDEX: 30.02 KG/M2 | TEMPERATURE: 97.7 F | HEART RATE: 68 BPM | SYSTOLIC BLOOD PRESSURE: 150 MMHG | WEIGHT: 186 LBS | DIASTOLIC BLOOD PRESSURE: 80 MMHG

## 2018-03-13 DIAGNOSIS — E11.9 TYPE 2 DIABETES MELLITUS WITHOUT COMPLICATION, WITHOUT LONG-TERM CURRENT USE OF INSULIN (H): ICD-10-CM

## 2018-03-13 DIAGNOSIS — E78.5 HYPERLIPIDEMIA LDL GOAL <100: ICD-10-CM

## 2018-03-13 DIAGNOSIS — M72.2 PLANTAR FASCIITIS: ICD-10-CM

## 2018-03-13 DIAGNOSIS — Z13.89 SCREENING FOR DIABETIC PERIPHERAL NEUROPATHY: Primary | ICD-10-CM

## 2018-03-13 LAB
ALBUMIN SERPL-MCNC: 3.8 G/DL (ref 3.4–5)
ALP SERPL-CCNC: 74 U/L (ref 40–150)
ALT SERPL W P-5'-P-CCNC: 25 U/L (ref 0–70)
ANION GAP SERPL CALCULATED.3IONS-SCNC: 8 MMOL/L (ref 3–14)
AST SERPL W P-5'-P-CCNC: 20 U/L (ref 0–45)
BILIRUB SERPL-MCNC: 0.3 MG/DL (ref 0.2–1.3)
BUN SERPL-MCNC: 9 MG/DL (ref 7–30)
CALCIUM SERPL-MCNC: 9.3 MG/DL (ref 8.5–10.1)
CHLORIDE SERPL-SCNC: 105 MMOL/L (ref 94–109)
CHOLEST SERPL-MCNC: 173 MG/DL
CO2 SERPL-SCNC: 26 MMOL/L (ref 20–32)
CREAT SERPL-MCNC: 0.78 MG/DL (ref 0.66–1.25)
GFR SERPL CREATININE-BSD FRML MDRD: >90 ML/MIN/1.7M2
GLUCOSE SERPL-MCNC: 127 MG/DL (ref 70–99)
HBA1C MFR BLD: 6.3 % (ref 4.3–6)
HDLC SERPL-MCNC: 38 MG/DL
LDLC SERPL CALC-MCNC: 98 MG/DL
NONHDLC SERPL-MCNC: 135 MG/DL
POTASSIUM SERPL-SCNC: 3.9 MMOL/L (ref 3.4–5.3)
PROT SERPL-MCNC: 8.3 G/DL (ref 6.8–8.8)
SODIUM SERPL-SCNC: 139 MMOL/L (ref 133–144)
TRIGL SERPL-MCNC: 186 MG/DL

## 2018-03-13 PROCEDURE — 82306 VITAMIN D 25 HYDROXY: CPT | Performed by: FAMILY MEDICINE

## 2018-03-13 PROCEDURE — 83036 HEMOGLOBIN GLYCOSYLATED A1C: CPT | Performed by: FAMILY MEDICINE

## 2018-03-13 PROCEDURE — 99213 OFFICE O/P EST LOW 20 MIN: CPT | Performed by: FAMILY MEDICINE

## 2018-03-13 PROCEDURE — 80053 COMPREHEN METABOLIC PANEL: CPT | Performed by: FAMILY MEDICINE

## 2018-03-13 PROCEDURE — 36415 COLL VENOUS BLD VENIPUNCTURE: CPT | Performed by: FAMILY MEDICINE

## 2018-03-13 PROCEDURE — 80061 LIPID PANEL: CPT | Performed by: FAMILY MEDICINE

## 2018-03-13 PROCEDURE — 99207 C FOOT EXAM  NO CHARGE: CPT | Performed by: FAMILY MEDICINE

## 2018-03-13 NOTE — NURSING NOTE
"Chief Complaint   Patient presents with     Musculoskeletal Problem     left foot x 2 weeks        Initial /80  Pulse 68  Temp 97.7  F (36.5  C) (Tympanic)  Wt 186 lb (84.4 kg)  BMI 30.02 kg/m2 Estimated body mass index is 30.02 kg/(m^2) as calculated from the following:    Height as of 4/12/17: 5' 6\" (1.676 m).    Weight as of this encounter: 186 lb (84.4 kg).  Medication Reconciliation: complete    Current Outpatient Prescriptions   Medication Sig Dispense Refill     metFORMIN (GLUCOPHAGE) 500 MG tablet Take 1 tablet (500 mg) by mouth 2 times daily (with meals) 60 tablet 8     omeprazole 20 MG tablet Take 1 tablet (20 mg) by mouth daily Take 30-60 minutes before a meal. 30 tablet 3     blood glucose monitoring (HÉCTOR CONTOUR) test strip Use to test blood sugars 1 times daily or as directed. 100 strip 11     blood glucose monitoring (HÉCTOR MICROLET) lancets Use to test blood sugar 1 times daily or as directed.  Ok to substitute alternative if insurance prefers. 1 Box prn     blood glucose monitoring (NO BRAND SPECIFIED) meter device kit Use to test blood sugar 1 times daily or as directed. 1 kit 0     [DISCONTINUED] ORDER FOR DME COUNTOUR NEXT BLOOD STRIPS    Use to test blood sugars 2x daily or as directed 100 strip prn       Zoran VAZQUEZ CMA  "

## 2018-03-13 NOTE — PROGRESS NOTES
SUBJECTIVE:   Abdi Ndiaye is a 38 year old male who presents to clinic today for the following health issues:      Joint Pain    Complains of pain in the portable of the feet for the last 2-3 weeks.  Worse with taking first few steps of the day.  Denies any fall or any trauma.  No foot swelling.  Pain is mostly in the back of the heel.           Onset: 2 weeks     Description:   Location: left foot   Character: Sharp    Intensity: moderate    Progression of Symptoms: intermittent    Accompanying Signs & Symptoms:  Other symptoms: none    History:   Previous similar pain: no       Precipitating factors:   Trauma or overuse: no     Alleviating factors:  Improved by: nothing    Therapies Tried and outcome: NONE      Diabetes: Patient has history of diabetes mellitus.  He is currently on metformin.  Last hemoglobin A1c was 6.2.  Currently taking medications.  He has not been compliant with follow-up visit.  He is currently living is planning to visit [facility] in coming days.            Problem list and histories reviewed & adjusted, as indicated.  Additional history: as documented    Patient Active Problem List   Diagnosis     Nonspecific reaction to tuberculin skin test without active tuberculosis     Chronic rhinitis     Vitamin D deficiency     Seasonal allergic rhinitis     Hypertension goal BP (blood pressure) < 140/80     Type 2 diabetes mellitus without complication, without long-term current use of insulin (H)     Past Surgical History:   Procedure Laterality Date     NO HISTORY OF SURGERY         Social History   Substance Use Topics     Smoking status: Never Smoker     Smokeless tobacco: Never Used     Alcohol use No     Family History   Problem Relation Age of Onset     C.A.D. No family hx of      DIABETES No family hx of      Hypertension No family hx of      Cancer - colorectal No family hx of      Prostate Cancer No family hx of      Eye Disorder No family hx of      Thyroid Disease No family hx of       Anesthesia Reaction No family hx of      Blood Disease No family hx of          Current Outpatient Prescriptions   Medication Sig Dispense Refill     metFORMIN (GLUCOPHAGE) 500 MG tablet Take 1 tablet (500 mg) by mouth 2 times daily (with meals) 60 tablet 8     omeprazole 20 MG tablet Take 1 tablet (20 mg) by mouth daily Take 30-60 minutes before a meal. 30 tablet 3     blood glucose monitoring (HÉCTOR CONTOUR) test strip Use to test blood sugars 1 times daily or as directed. 100 strip 11     blood glucose monitoring (HÉCTOR MICROLET) lancets Use to test blood sugar 1 times daily or as directed.  Ok to substitute alternative if insurance prefers. 1 Box prn     blood glucose monitoring (NO BRAND SPECIFIED) meter device kit Use to test blood sugar 1 times daily or as directed. 1 kit 0     [DISCONTINUED] ORDER FOR DME COUNTOUR NEXT BLOOD STRIPS    Use to test blood sugars 2x daily or as directed 100 strip prn       Reviewed and updated as needed this visit by clinical staff  Tobacco  Allergies  Meds       Reviewed and updated as needed this visit by Provider         ROS:  CONSTITUTIONAL: NEGATIVE for fever, chills, change in weight  ENT/MOUTH: NEGATIVE for ear, mouth and throat problems  RESP: NEGATIVE for significant cough or SOB  CV: NEGATIVE for chest pain, palpitations or peripheral edema    OBJECTIVE:                                                    /80  Pulse 68  Temp 97.7  F (36.5  C) (Tympanic)  Wt 186 lb (84.4 kg)  BMI 30.02 kg/m2  Body mass index is 30.02 kg/(m^2).   GENERAL: healthy, alert, well nourished, well hydrated, no distress  HENT: ear canals- normal; TMs- normal; Nose- normal; Mouth- no ulcers, no lesions  NECK: no tenderness, no adenopathy, no asymmetry, no masses, no stiffness; thyroid- normal to palpation  RESP: lungs clear to auscultation - no rales, no rhonchi, no wheezes  CV: regular rates and rhythm, normal S1 S2, no S3 or S4 and no murmur, no click or rub -  Foot pain in the  heel.  Normal reflexes in the feet.         ASSESSMENT/PLAN:                                                        ICD-10-CM    1. Screening for diabetic peripheral neuropathy Z13.89 FOOT EXAM  NO CHARGE [64623.114]     Hemoglobin A1c     Vitamin D Deficiency     Comprehensive metabolic panel     Lipid Profile (Chol, Trig, HDL, LDL calc)     Albumin Random Urine Quantitative with Creat Ratio   2. Type 2 diabetes mellitus without complication, without long-term current use of insulin (H) E11.9 FOOT EXAM  NO CHARGE [21394.114]     Hemoglobin A1c     Vitamin D Deficiency     Comprehensive metabolic panel     Lipid Profile (Chol, Trig, HDL, LDL calc)     Albumin Random Urine Quantitative with Creat Ratio   3. Plantar fasciitis M72.2    4. Hyperlipidemia LDL goal <100 E78.5 Comprehensive metabolic panel     Lipid Profile (Chol, Trig, HDL, LDL calc)     Patient foot pain most likely secondary to plantar fasciitis.  I discussed with him we need to do some stretching exercises along with wearing better shoes.  I gave him a handout to do regular exercises at home.  Patient has history of type 2 diabetes mellitus currently on Metformin.  I have ordered some blood work.  Once done we will follow-up on that.  He is advised to follow-up in the next 2-3 days to discuss lab results.      Isac Hare MD  [facility] [place]

## 2018-03-13 NOTE — MR AVS SNAPSHOT
After Visit Summary   3/13/2018    Abdi Ndiaye    MRN: [id number]           Patient Information     Date Of Birth          1980        Visit Information        Provider Department      3/13/2018 9:20 AM Isac Hare MD [facility]        Today's Diagnoses     Screening for diabetic peripheral neuropathy    -  1    Type 2 diabetes mellitus without complication, without long-term current use of insulin (H)        Plantar fasciitis        Hyperlipidemia LDL goal <100           Follow-ups after your visit        Follow-up notes from your care team     Return in about 2 years (around 3/13/2020) for for lab review.      Your next 10 appointments already scheduled     Mar 16, 2018  8:00 AM CDT   Office Visit with Isca Hare MD   [facility] [place] [facility]    [address] [place] [id number] [phone]           Bring a current list of meds and any records pertaining to this visit. For Physicals, please bring immunization records and any forms needing to be filled out. Please arrive 10 minutes early to complete paperwork.              Who to contact     If you have questions or need follow up information about today's clinic visit or your schedule please contact [facility] directly at [phone].  Normal or non-critical lab and imaging results will be communicated to you by MyChart, letter or phone within 4 business days after the clinic has received the results. If you do not hear from us within 7 days, please contact the clinic through [employer] or phone. If you have a critical or abnormal lab result, we will notify you by phone as soon as possible.  Submit refill requests through [employer] or call your pharmacy and they will forward the refill request to us. Please allow 3 business days for your refill to be completed.          Additional Information About Your Visit        MyChart Information     [employer]  "lets you send messages to your doctor, view your test results, renew your prescriptions, schedule appointments and more. To sign up, go to [email or web address] . Click on \"Log in\" on the left side of the screen, which will take you to the Welcome page. Then click on \"Sign up Now\" on the right side of the page.     You will be asked to enter the access code listed below, as well as some personal information. Please follow the directions to create your username and password.     Your access code is: [email or web address]  Expires: 2018 10:00 AM     Your access code will  in 90 days. If you need help or a new code, please call your [facility] clinic or [phone].        Care EveryWhere ID     This is your Care EveryWhere ID. This could be used by other organizations to access your [facility] medical records  [id number]        Your Vitals Were     Pulse Temperature BMI (Body Mass Index)             68 97.7  F (36.5  C) (Tympanic) 30.02 kg/m2          Blood Pressure from Last 3 Encounters:   18 150/80   17 136/84   17 134/80    Weight from Last 3 Encounters:   18 186 lb (84.4 kg)   17 183 lb (83 kg)   17 191 lb (86.6 kg)              We Performed the Following     Albumin Random Urine Quantitative with Creat Ratio     Comprehensive metabolic panel     FOOT EXAM  NO CHARGE [64616.114]     Hemoglobin A1c     Lipid Profile (Chol, Trig, HDL, LDL calc)     Vitamin D Deficiency        Primary Care Provider Office Phone # Fax #    Isac Hare [phone]       [address]  [place] [address]        Equal Access to Services     [facility] : Syed Giron, cristian sinha, estelle jin, mariah ren. So [facility] [phone].    ATENCIÓN: Si habla español, tiene a rodriguez disposición servicios gratuitos de asistencia lingüística. Llame al [phone].    We comply with applicable federal civil rights laws " and [place] laws. We do not discriminate on the basis of race, color, national origin, age, disability, sex, sexual orientation, or gender identity.            Thank you!     Thank you for choosing [facility] [place]  for your care. Our goal is always to provide you with excellent care. Hearing back from our patients is one way we can continue to improve our services. Please take a few minutes to complete the written survey that you may receive in the mail after your visit with us. Thank you!             Your Updated Medication List - Protect others around you: Learn how to safely use, store and throw away your medicines at www.disposemymeds.org.          This list is accurate as of 3/13/18 10:00 AM.  Always use your most recent med list.                   Brand Name Dispense Instructions for use Diagnosis    blood glucose monitoring lancets     1 Box    Use to test blood sugar 1 times daily or as directed.  Ok to substitute alternative if insurance prefers.    Type 2 diabetes mellitus without complication, without long-term current use of insulin (H)       blood glucose monitoring meter device kit    no brand specified    1 kit    Use to test blood sugar 1 times daily or as directed.    Type 2 diabetes mellitus without complication, without long-term current use of insulin (H)       blood glucose monitoring test strip    HÉCTOR CONTOUR    100 strip    Use to test blood sugars 1 times daily or as directed.    Type 2 diabetes mellitus without complication, without long-term current use of insulin (H)       metFORMIN 500 MG tablet    GLUCOPHAGE    60 tablet    Take 1 tablet (500 mg) by mouth 2 times daily (with meals)    Type 2 diabetes mellitus without complication, without long-term current use of insulin (H)       omeprazole 20 MG tablet     30 tablet    Take 1 tablet (20 mg) by mouth daily Take 30-60 minutes before a meal.    Gastroesophageal reflux disease without esophagitis

## 2018-03-14 LAB — DEPRECATED CALCIDIOL+CALCIFEROL SERPL-MC: 18 UG/L (ref 20–75)

## 2018-03-20 ENCOUNTER — OFFICE VISIT (OUTPATIENT)
Dept: FAMILY MEDICINE | Facility: CLINIC | Age: 38
End: 2018-03-20
Payer: COMMERCIAL

## 2018-03-20 VITALS
WEIGHT: 187 LBS | SYSTOLIC BLOOD PRESSURE: 124 MMHG | TEMPERATURE: 98.2 F | BODY MASS INDEX: 30.18 KG/M2 | DIASTOLIC BLOOD PRESSURE: 70 MMHG | HEART RATE: 64 BPM

## 2018-03-20 DIAGNOSIS — E55.9 VITAMIN D DEFICIENCY: ICD-10-CM

## 2018-03-20 DIAGNOSIS — Z13.89 SCREENING FOR DIABETIC PERIPHERAL NEUROPATHY: ICD-10-CM

## 2018-03-20 DIAGNOSIS — E78.5 HYPERLIPIDEMIA LDL GOAL <100: ICD-10-CM

## 2018-03-20 DIAGNOSIS — I10 HYPERTENSION GOAL BP (BLOOD PRESSURE) < 140/80: Primary | ICD-10-CM

## 2018-03-20 DIAGNOSIS — E11.9 TYPE 2 DIABETES MELLITUS WITHOUT COMPLICATION, WITHOUT LONG-TERM CURRENT USE OF INSULIN (H): ICD-10-CM

## 2018-03-20 LAB
CREAT UR-MCNC: 212 MG/DL
MICROALBUMIN UR-MCNC: 12 MG/L
MICROALBUMIN/CREAT UR: 5.66 MG/G CR (ref 0–17)

## 2018-03-20 PROCEDURE — 99213 OFFICE O/P EST LOW 20 MIN: CPT | Performed by: FAMILY MEDICINE

## 2018-03-20 PROCEDURE — 82043 UR ALBUMIN QUANTITATIVE: CPT | Performed by: FAMILY MEDICINE

## 2018-03-20 NOTE — MR AVS SNAPSHOT
"              After Visit Summary   3/20/2018    Abdi Ndiaye    MRN: [id number]           Patient Information     Date Of Birth          1980        Visit Information        Provider Department      3/20/2018 9:00 AM Isac Hare MD [facility] [place]        Today's Diagnoses     Hypertension goal BP (blood pressure) < 140/80    -  1    Type 2 diabetes mellitus without complication, without long-term current use of insulin (H)        Vitamin D deficiency        Hyperlipidemia LDL goal <100        Screening for diabetic peripheral neuropathy           Follow-ups after your visit        Follow-up notes from your care team     Return in about 6 months (around 9/20/2018) for Diabeties check.      Who to contact     If you have questions or need follow up information about today's clinic visit or your schedule please contact [facility] [place] directly at [phone].  Normal or non-critical lab and imaging results will be communicated to you by MyChart, letter or phone within 4 business days after the clinic has received the results. If you do not hear from us within 7 days, please contact the clinic through [employer] or phone. If you have a critical or abnormal lab result, we will notify you by phone as soon as possible.  Submit refill requests through [employer] or call your pharmacy and they will forward the refill request to us. Please allow 3 business days for your refill to be completed.          Additional Information About Your Visit        MyChart Information     [employer] lets you send messages to your doctor, view your test results, renew your prescriptions, schedule appointments and more. To sign up, go to [email or web address] . Click on \"Log in\" on the left side of the screen, which will take you to the Welcome page. Then click on \"Sign up Now\" on the right side of the page.     You will be asked to enter the access code listed below, as well as some personal information. Please " follow the directions to create your username and password.     Your access code is: [email or web address]  Expires: 2018 10:00 AM     Your access code will  in 90 days. If you need help or a new code, please call your [place] clinic or [phone].        Care EveryWhere ID     This is your Care EveryWhere ID. This could be used by other organizations to access your [place] medical records  [id number]        Your Vitals Were     Pulse Temperature BMI (Body Mass Index)             64 98.2  F (36.8  C) (Tympanic) 30.18 kg/m2          Blood Pressure from Last 3 Encounters:   18 124/70   18 150/80   17 136/84    Weight from Last 3 Encounters:   18 187 lb (84.8 kg)   18 186 lb (84.4 kg)   17 183 lb (83 kg)              We Performed the Following     Albumin Random Urine Quantitative with Creat Ratio          Where to get your medicines      These medications were sent to [id number] - [place], [place] - [address]  [address], [place] [address]     Phone:  [phone]     blood glucose monitoring test strip    metFORMIN 500 MG tablet          Primary Care Provider Office Phone # Fax #    Isac Hare [phone]       [address]  [place] [address]        Equal Access to Services     [facility] AH: Hadii deonte jimenez hadasho Sotee, waaxda luqadaha, qaybta kaalmada annabel, mariah sheppard . So [facility] [phone].    ATENCIÓN: Si habla español, tiene a rodriguez disposición servicios gratuitos de asistencia lingüística. Llame al [phone].    We comply with applicable federal civil rights laws and [place] laws. We do not discriminate on the basis of race, color, national origin, age, disability, sex, sexual orientation, or gender identity.            Thank you!     Thank you for choosing [facility] [place]  for your care. Our goal is always to provide you with excellent care. Hearing back  from our patients is one way we can continue to improve our services. Please take a few minutes to complete the written survey that you may receive in the mail after your visit with us. Thank you!             Your Updated Medication List - Protect others around you: Learn how to safely use, store and throw away your medicines at www.disposemymeds.org.          This list is accurate as of 3/20/18  9:23 AM.  Always use your most recent med list.                   Brand Name Dispense Instructions for use Diagnosis    blood glucose monitoring lancets     1 Box    Use to test blood sugar 1 times daily or as directed.  Ok to substitute alternative if insurance prefers.    Type 2 diabetes mellitus without complication, without long-term current use of insulin (H)       blood glucose monitoring meter device kit    no brand specified    1 kit    Use to test blood sugar 1 times daily or as directed.    Type 2 diabetes mellitus without complication, without long-term current use of insulin (H)       blood glucose monitoring test strip    HÉCTOR CONTOUR    100 strip    Use to test blood sugars 1 times daily or as directed.    Type 2 diabetes mellitus without complication, without long-term current use of insulin (H)       metFORMIN 500 MG tablet    GLUCOPHAGE    180 tablet    Take 1 tablet (500 mg) by mouth 2 times daily (with meals)    Type 2 diabetes mellitus without complication, without long-term current use of insulin (H)       omeprazole 20 MG tablet     30 tablet    Take 1 tablet (20 mg) by mouth daily Take 30-60 minutes before a meal.    Gastroesophageal reflux disease without esophagitis

## 2018-03-20 NOTE — PROGRESS NOTES
SUBJECTIVE:   Abdi Ndiaye is a 38 year old male who presents to clinic today for the following health issues:      Diabetes Follow-up  Discuss lab results     Patient is checking blood sugars: not at all    Diabetic concerns: None     Symptoms of hypoglycemia (low blood sugar): none     Paresthesias (numbness or burning in feet) or sores: No     Date of last diabetic eye exam: 6/2017    Labs done recently AIC is 6.3  On metformin 500 mg BID.    BP Readings from Last 2 Encounters:   03/20/18 124/70   03/13/18 150/80     Hemoglobin A1C (%)   Date Value   03/13/2018 6.3 (H)   08/24/2017 6.2 (H)     LDL Cholesterol Calculated (mg/dL)   Date Value   03/13/2018 98   04/12/2017 89       Amount of exercise or physical activity: 3 days per week    Problems taking medications regularly: No    Medication side effects: none    Diet: regular (no restrictions)          Problem list and histories reviewed & adjusted, as indicated.  Additional history: as documented    Patient Active Problem List   Diagnosis     Nonspecific reaction to tuberculin skin test without active tuberculosis     Chronic rhinitis     Vitamin D deficiency     Seasonal allergic rhinitis     Hypertension goal BP (blood pressure) < 140/80     Type 2 diabetes mellitus without complication, without long-term current use of insulin (H)     Past Surgical History:   Procedure Laterality Date     NO HISTORY OF SURGERY         Social History   Substance Use Topics     Smoking status: Never Smoker     Smokeless tobacco: Never Used     Alcohol use No     Family History   Problem Relation Age of Onset     C.A.D. No family hx of      DIABETES No family hx of      Hypertension No family hx of      Cancer - colorectal No family hx of      Prostate Cancer No family hx of      Eye Disorder No family hx of      Thyroid Disease No family hx of      Anesthesia Reaction No family hx of      Blood Disease No family hx of          Current Outpatient Prescriptions   Medication  Sig Dispense Refill     metFORMIN (GLUCOPHAGE) 500 MG tablet Take 1 tablet (500 mg) by mouth 2 times daily (with meals) 180 tablet 1     blood glucose monitoring (HÉCTOR CONTOUR) test strip Use to test blood sugars 1 times daily or as directed. 100 strip 1     omeprazole 20 MG tablet Take 1 tablet (20 mg) by mouth daily Take 30-60 minutes before a meal. (Patient not taking: Reported on 3/20/2018) 30 tablet 3     [DISCONTINUED] metFORMIN (GLUCOPHAGE) 500 MG tablet Take 1 tablet (500 mg) by mouth 2 times daily (with meals) 60 tablet 8     blood glucose monitoring (HÉCTOR MICROLET) lancets Use to test blood sugar 1 times daily or as directed.  Ok to substitute alternative if insurance prefers. 1 Box prn     blood glucose monitoring (NO BRAND SPECIFIED) meter device kit Use to test blood sugar 1 times daily or as directed. 1 kit 0     [DISCONTINUED] ORDER FOR DME COUNTOUR NEXT BLOOD STRIPS    Use to test blood sugars 2x daily or as directed 100 strip prn       Reviewed and updated as needed this visit by clinical staff  Tobacco  Allergies  Meds       Reviewed and updated as needed this visit by Provider         ROS:  CONSTITUTIONAL: NEGATIVE for fever, chills, change in weight  ENT/MOUTH: NEGATIVE for ear, mouth and throat problems  RESP: NEGATIVE for significant cough or SOB  CV: NEGATIVE for chest pain, palpitations or peripheral edema    OBJECTIVE:                                                    /70  Pulse 64  Temp 98.2  F (36.8  C) (Tympanic)  Wt 187 lb (84.8 kg)  BMI 30.18 kg/m2  Body mass index is 30.18 kg/(m^2).   GENERAL: healthy, alert, well nourished, well hydrated, no distress  NECK: no tenderness, no adenopathy, no asymmetry, no masses, no stiffness; thyroid- normal to palpation  RESP: lungs clear to auscultation - no rales, no rhonchi, no wheezes  CV: regular rates and rhythm, normal S1 S2, no S3 or S4 and no murmur, no click or rub -  ABDOMEN: soft, no tenderness, no  hepatosplenomegaly, no  masses, normal bowel sounds         ASSESSMENT/PLAN:                                                        ICD-10-CM    1. Hypertension goal BP (blood pressure) < 140/80 I10    2. Type 2 diabetes mellitus without complication, without long-term current use of insulin (H) E11.9 metFORMIN (GLUCOPHAGE) 500 MG tablet     blood glucose monitoring (HÉCTOR CONTOUR) test strip     Albumin Random Urine Quantitative with Creat Ratio   3. Vitamin D deficiency E55.9    4. Hyperlipidemia LDL goal <100 E78.5    5. Screening for diabetic peripheral neuropathy Z13.89 Albumin Random Urine Quantitative with Creat Ratio     Labs reviewed with him. AIC is stable, he should continue the same dose of medication.  Dicussed diet intake. Decrease carb's.  Cholesterol is still LDL under 100. He should continue do regular exeresis. Follow up in 6 months for recheck.      Isac Hare MD  [facility] [place]

## 2018-03-20 NOTE — NURSING NOTE
"Chief Complaint   Patient presents with     Diabetes     lab results        Initial /70  Pulse 64  Temp 98.2  F (36.8  C) (Tympanic)  Wt 187 lb (84.8 kg)  BMI 30.18 kg/m2 Estimated body mass index is 30.18 kg/(m^2) as calculated from the following:    Height as of 4/12/17: 5' 6\" (1.676 m).    Weight as of this encounter: 187 lb (84.8 kg).  Medication Reconciliation: complete    Current Outpatient Prescriptions   Medication Sig Dispense Refill     metFORMIN (GLUCOPHAGE) 500 MG tablet Take 1 tablet (500 mg) by mouth 2 times daily (with meals) 60 tablet 8     omeprazole 20 MG tablet Take 1 tablet (20 mg) by mouth daily Take 30-60 minutes before a meal. (Patient not taking: Reported on 3/20/2018) 30 tablet 3     blood glucose monitoring (HÉCTOR CONTOUR) test strip Use to test blood sugars 1 times daily or as directed. 100 strip 11     blood glucose monitoring (HÉCTOR MICROLET) lancets Use to test blood sugar 1 times daily or as directed.  Ok to substitute alternative if insurance prefers. 1 Box prn     blood glucose monitoring (NO BRAND SPECIFIED) meter device kit Use to test blood sugar 1 times daily or as directed. 1 kit 0     [DISCONTINUED] ORDER FOR DME COUNTOUR NEXT BLOOD STRIPS    Use to test blood sugars 2x daily or as directed 100 strip prn       Zoran VAZQUEZ CMA  "

## 2018-07-01 ENCOUNTER — TRANSFERRED RECORDS (OUTPATIENT)
Dept: HEALTH INFORMATION MANAGEMENT | Facility: CLINIC | Age: 38
End: 2018-07-01

## 2018-08-02 ENCOUNTER — OFFICE VISIT (OUTPATIENT)
Dept: FAMILY MEDICINE | Facility: CLINIC | Age: 38
End: 2018-08-02
Payer: COMMERCIAL

## 2018-08-02 VITALS
TEMPERATURE: 97.7 F | SYSTOLIC BLOOD PRESSURE: 130 MMHG | WEIGHT: 180 LBS | HEART RATE: 64 BPM | DIASTOLIC BLOOD PRESSURE: 80 MMHG | BODY MASS INDEX: 28.93 KG/M2 | HEIGHT: 66 IN

## 2018-08-02 DIAGNOSIS — I10 HYPERTENSION GOAL BP (BLOOD PRESSURE) < 140/80: ICD-10-CM

## 2018-08-02 DIAGNOSIS — E56.9 VITAMIN DEFICIENCY: ICD-10-CM

## 2018-08-02 DIAGNOSIS — K21.9 GASTROESOPHAGEAL REFLUX DISEASE WITHOUT ESOPHAGITIS: ICD-10-CM

## 2018-08-02 DIAGNOSIS — E11.9 TYPE 2 DIABETES MELLITUS WITHOUT COMPLICATION, WITHOUT LONG-TERM CURRENT USE OF INSULIN (H): Primary | ICD-10-CM

## 2018-08-02 LAB — HBA1C MFR BLD: 6.1 % (ref 0–5.6)

## 2018-08-02 PROCEDURE — 99214 OFFICE O/P EST MOD 30 MIN: CPT | Performed by: FAMILY MEDICINE

## 2018-08-02 PROCEDURE — 36415 COLL VENOUS BLD VENIPUNCTURE: CPT | Performed by: FAMILY MEDICINE

## 2018-08-02 PROCEDURE — 83036 HEMOGLOBIN GLYCOSYLATED A1C: CPT | Performed by: FAMILY MEDICINE

## 2018-08-02 RX ORDER — NICOTINE POLACRILEX 4 MG/1
20 GUM, CHEWING ORAL DAILY
Qty: 30 TABLET | Refills: 3 | Status: SHIPPED | OUTPATIENT
Start: 2018-08-02 | End: 2020-04-14

## 2018-08-02 NOTE — Clinical Note
Please abstract the following data from this visit with this patient into the appropriate field in Epic:  Eye exam with ophthalmology on this date: 7/2018

## 2018-08-02 NOTE — MR AVS SNAPSHOT
After Visit Summary   8/2/2018    Abdi Ndiaye    MRN: [id number]           Patient Information     Date Of Birth          1980        Visit Information        Provider Department      8/2/2018 9:40 AM Isac Hare MD [facility] [place]        Today's Diagnoses     Type 2 diabetes mellitus without complication, without long-term current use of insulin (H)    -  1    Hypertension goal BP (blood pressure) < 140/80        Gastroesophageal reflux disease without esophagitis        Vitamin deficiency           Follow-ups after your visit        Follow-up notes from your care team     Return in about 6 months (around 2/2/2019) for Diabeties check.      Your next 10 appointments already scheduled     Aug 06, 2018  4:15 PM CDT   Office Visit with THANH De La Paz CNP   [facility]    [address]  [address]  [facility] [address] [phone]           Bring a current list of meds and any records pertaining to this visit. For Physicals, please bring immunization records and any forms needing to be filled out. Please arrive 10 minutes early to complete paperwork.              Who to contact     If you have questions or need follow up information about today's clinic visit or your schedule please contact [facility] [place] directly at [phone].  Normal or non-critical lab and imaging results will be communicated to you by MyChart, letter or phone within 4 business days after the clinic has received the results. If you do not hear from us within 7 days, please contact the clinic through MyChart or phone. If you have a critical or abnormal lab result, we will notify you by phone as soon as possible.  Submit refill requests through [employer] or call your pharmacy and they will forward the refill request to us. Please allow 3 business days for your refill to be completed.          Additional Information About Your Visit       "  Care EveryWhere ID     This is your Care EveryWhere ID. This could be used by other organizations to access your [place] medical records  [id number]        Your Vitals Were     Pulse Temperature Height BMI (Body Mass Index)          64 97.7  F (36.5  C) (Tympanic) 5' 6.34\" (1.685 m) 28.76 kg/m2         Blood Pressure from Last 3 Encounters:   08/02/18 130/80   03/20/18 124/70   03/13/18 150/80    Weight from Last 3 Encounters:   08/02/18 180 lb (81.6 kg)   03/20/18 187 lb (84.8 kg)   03/13/18 186 lb (84.4 kg)              We Performed the Following     Hemoglobin A1c          Today's Medication Changes          These changes are accurate as of 8/2/18 10:25 AM.  If you have any questions, ask your nurse or doctor.               Start taking these medicines.        Dose/Directions    cholecalciferol 1000 UNIT tablet   Commonly known as:  vitamin D3   Used for:  Vitamin deficiency        Dose:  2000 Units   Take 2 tablets (2,000 Units) by mouth daily   Quantity:  100 tablet   Refills:  3            Where to get your medicines      These medications were sent to [id number] - [place], [place] - [address]  [address], [place] [address]     Phone:  [phone]     cholecalciferol 1000 UNIT tablet    metFORMIN 500 MG tablet    omeprazole 20 MG tablet                Primary Care Provider Office Phone # Fax #    Isac Hare [phone]       [address]  [place] [address]        Equal Access to Services     [facility]: Hadii deonte sino Sotee, waaxda luqadaha, qaybta kaalmada adeegtarah, wax rodney ren. So [facility] [phone].    ATENCIÓN: Si habla español, tiene a rodriguez disposición servicios gratuitos de asistencia lingüística. Llame al [phone].    We comply with applicable federal civil rights laws and [place] laws. We do not discriminate on the basis of race, color, national origin, age, disability, sex, sexual " orientation, or gender identity.            Thank you!     Thank you for choosing [facility] [place]  for your care. Our goal is always to provide you with excellent care. Hearing back from our patients is one way we can continue to improve our services. Please take a few minutes to complete the written survey that you may receive in the mail after your visit with us. Thank you!             Your Updated Medication List - Protect others around you: Learn how to safely use, store and throw away your medicines at www.disposemymeds.org.          This list is accurate as of 8/2/18 10:25 AM.  Always use your most recent med list.                   Brand Name Dispense Instructions for use Diagnosis    blood glucose monitoring lancets     1 Box    Use to test blood sugar 1 times daily or as directed.  Ok to substitute alternative if insurance prefers.    Type 2 diabetes mellitus without complication, without long-term current use of insulin (H)       blood glucose monitoring meter device kit    no brand specified    1 kit    Use to test blood sugar 1 times daily or as directed.    Type 2 diabetes mellitus without complication, without long-term current use of insulin (H)       blood glucose monitoring test strip    HÉCTOR CONTOUR    100 strip    Use to test blood sugars 1 times daily or as directed.    Type 2 diabetes mellitus without complication, without long-term current use of insulin (H)       cholecalciferol 1000 UNIT tablet    vitamin D3    100 tablet    Take 2 tablets (2,000 Units) by mouth daily    Vitamin deficiency       metFORMIN 500 MG tablet    GLUCOPHAGE    180 tablet    Take 1 tablet (500 mg) by mouth 2 times daily (with meals)    Type 2 diabetes mellitus without complication, without long-term current use of insulin (H)       omeprazole 20 MG tablet     30 tablet    Take 1 tablet (20 mg) by mouth daily Take 30-60 minutes before a meal.    Gastroesophageal reflux disease without esophagitis

## 2018-08-02 NOTE — PROGRESS NOTES
"  SUBJECTIVE:   Abdi Ndiaye is a 38 year old male who presents to clinic today for the following health issues:      Diabetes Follow-up      Patient is checking blood sugars: 1-2 times per day    Diabetic concerns: None     Symptoms of hypoglycemia (low blood sugar): none     Paresthesias (numbness or burning in feet) or sores: Yes      Date of last diabetic eye exam: Last week     BP Readings from Last 2 Encounters:   08/02/18 130/80   03/20/18 124/70     Hemoglobin A1C (%)   Date Value   08/02/2018 6.1 (H)   03/13/2018 6.3 (H)     LDL Cholesterol Calculated (mg/dL)   Date Value   03/13/2018 98   04/12/2017 89       Diabetes Management Resources    Amount of exercise or physical activity: 3 days per week, 60mins    Problems taking medications regularly: No    Medication side effects: none    Diet: low fat/cholesterol        GERD/Heartburn  Onset: 3-4 weeks     Description:     Burning in chest: YES    Intensity: moderate    Progression of Symptoms: improving    Accompanying Signs & Symptoms:  Does it feel like food gets stuck: no   Nausea: YES  Vomiting (bloody?): no   Abdominal Pain: YES- pressure   Black-Tarry stools: no :  Bloody stools: no     Therapies Tried and outcome:Omeprazole in the past     Problem list and histories reviewed & adjusted, as indicated.  Additional history: as documented      Reviewed and updated as needed this visit by clinical staff  Allergies  Meds       Reviewed and updated as needed this visit by Provider         ROS:  CONSTITUTIONAL: NEGATIVE for fever, chills, change in weight  ENT/MOUTH: NEGATIVE for ear, mouth and throat problems  RESP: NEGATIVE for significant cough or SOB  CV: NEGATIVE for chest pain, palpitations or peripheral edema    OBJECTIVE:                                                    /80  Pulse 64  Temp 97.7  F (36.5  C) (Tympanic)  Ht 5' 6.34\" (1.685 m)  Wt 180 lb (81.6 kg)  BMI 28.76 kg/m2  Body mass index is 28.76 kg/(m^2).   GENERAL: healthy, " alert, well nourished, well hydrated, no distress  HENT: ear canals- normal; TMs- normal; Nose- normal; Mouth- no ulcers, no lesions  NECK: no tenderness, no adenopathy, no asymmetry, no masses, no stiffness; thyroid- normal to palpation  RESP: lungs clear to auscultation - no rales, no rhonchi, no wheezes  CV: regular rates and rhythm, normal S1 S2, no S3 or S4 and no murmur, no click or rub -  ABDOMEN: soft, no tenderness, no  hepatosplenomegaly, no masses, normal bowel sounds         ASSESSMENT/PLAN:                                                        ICD-10-CM    1. Type 2 diabetes mellitus without complication, without long-term current use of insulin (H) E11.9 Hemoglobin A1c     metFORMIN (GLUCOPHAGE) 500 MG tablet   2. Hypertension goal BP (blood pressure) < 140/80 I10    3. Gastroesophageal reflux disease without esophagitis K21.9 omeprazole 20 MG tablet   4. Vitamin deficiency E56.9 cholecalciferol (VITAMIN D3) 1000 UNIT tablet       Patient blood sugar is are much controlled.  He is advised to continue taking Metformin 500 mg twice daily.  Follow-up in 6 months.  Acid reflux using omeprazole.  Advised to use daily before breakfast.  Vitamin D in the past was low.  I have ordered  vitamin D 2000  international units daily for the next 2-3 months.  Follow-up in 6 months for recheck.    Isac Hare MD  [facility] [place]

## 2018-08-06 ENCOUNTER — OFFICE VISIT (OUTPATIENT)
Dept: FAMILY MEDICINE | Facility: CLINIC | Age: 38
End: 2018-08-06
Payer: COMMERCIAL

## 2018-08-06 VITALS — SYSTOLIC BLOOD PRESSURE: 123 MMHG | TEMPERATURE: 97.8 F | DIASTOLIC BLOOD PRESSURE: 80 MMHG

## 2018-08-06 DIAGNOSIS — Z71.84 TRAVEL ADVICE ENCOUNTER: Primary | ICD-10-CM

## 2018-08-06 DIAGNOSIS — Z23 NEED FOR VACCINATION: ICD-10-CM

## 2018-08-06 PROCEDURE — 90472 IMMUNIZATION ADMIN EACH ADD: CPT | Mod: GA | Performed by: NURSE PRACTITIONER

## 2018-08-06 PROCEDURE — 90715 TDAP VACCINE 7 YRS/> IM: CPT | Mod: GA | Performed by: NURSE PRACTITIONER

## 2018-08-06 PROCEDURE — 90691 TYPHOID VACCINE IM: CPT | Mod: GA | Performed by: NURSE PRACTITIONER

## 2018-08-06 PROCEDURE — 99402 PREV MED CNSL INDIV APPRX 30: CPT | Mod: 25 | Performed by: NURSE PRACTITIONER

## 2018-08-06 PROCEDURE — 90471 IMMUNIZATION ADMIN: CPT | Mod: GA | Performed by: NURSE PRACTITIONER

## 2018-08-06 PROCEDURE — 90734 MENACWYD/MENACWYCRM VACC IM: CPT | Mod: GA | Performed by: NURSE PRACTITIONER

## 2018-08-06 RX ORDER — AZITHROMYCIN 500 MG/1
500 TABLET, FILM COATED ORAL DAILY
Qty: 3 TABLET | Refills: 0 | Status: SHIPPED | OUTPATIENT
Start: 2018-08-06 | End: 2018-08-09

## 2018-08-06 NOTE — PATIENT INSTRUCTIONS
Today August 6, 2018 you received the    Tetanus (Tdap) Vaccine    Meningococcal (Menactra) Vaccine    Typhoid - injectable. This vaccine is valid for two years.   .    These appointments can be made as a NURSE ONLY visit.    **It is very important for the vaccinations to be given on the scheduled day(s), this helps ensure you receive the full effectiveness of the vaccine.**    Please call [facility] with any questions [phone]    Thank you for visiting [facility]

## 2018-08-06 NOTE — MR AVS SNAPSHOT
After Visit Summary   8/6/2018    Abdi Ndiaye    MRN: [id number]           Patient Information     Date Of Birth          1980        Visit Information        Provider Department      8/6/2018 4:15 PM Yaima Abreu APRN [facility]        Today's Diagnoses     Travel advice encounter    -  1    Need for vaccination          Care Instructions    Today August 6, 2018 you received the    Tetanus (Tdap) Vaccine    Meningococcal (Menactra) Vaccine    Typhoid - injectable. This vaccine is valid for two years.   .    These appointments can be made as a NURSE ONLY visit.    **It is very important for the vaccinations to be given on the scheduled day(s), this helps ensure you receive the full effectiveness of the vaccine.**    Please call [facility] with any questions [phone]    Thank you for visiting [facility]              Follow-ups after your visit        Who to contact     If you have questions or need follow up information about today's clinic visit or your schedule please contact [facility] directly at [phone].  Normal or non-critical lab and imaging results will be communicated to you by MyChart, letter or phone within 4 business days after the clinic has received the results. If you do not hear from us within 7 days, please contact the clinic through MyChart or phone. If you have a critical or abnormal lab result, we will notify you by phone as soon as possible.  Submit refill requests through [employer] or call your pharmacy and they will forward the refill request to us. Please allow 3 business days for your refill to be completed.          Additional Information About Your Visit        Care EveryWhere ID     This is your Care EveryWhere ID. This could be used by other organizations to access your [facility] medical records  [id number]        Your Vitals Were     Temperature                   97.8  F (36.6  C)  (Oral)            Blood Pressure from Last 3 Encounters:   08/06/18 123/80   08/02/18 130/80   03/20/18 124/70    Weight from Last 3 Encounters:   08/02/18 180 lb (81.6 kg)   03/20/18 187 lb (84.8 kg)   03/13/18 186 lb (84.4 kg)              We Performed the Following     MENINGOCOCCAL VACCINE,IM (MENACTRA)     TDAP, IM (10 - 64 YRS) - Adacel     TYPHOID VACCINE, IM          Today's Medication Changes          These changes are accurate as of 8/6/18  4:25 PM.  If you have any questions, ask your nurse or doctor.               Start taking these medicines.        Dose/Directions    azithromycin 500 MG tablet   Commonly known as:  ZITHROMAX   Used for:  Travel advice encounter   Started by:  Yaima Abreu APRN CNP        Dose:  500 mg   Take 1 tablet (500 mg) by mouth daily for 3 doses Take 1 tablet a day for up to 3 days for severe diarrhea   Quantity:  3 tablet   Refills:  0            Where to get your medicines      These medications were sent to [id number] - [place], [place] - [address], [place] [address]     Phone:  [phone]     azithromycin 500 MG tablet                Primary Care Provider Office Phone # Fax #    Isac Hare [phone]       [address] [facility] [place] [place] [address]        Equal Access to Services     [facility] AH: Hadii deonte Giron, waaxda luqadaha, qaybta kaalmariah bartlett . So [facility] [phone].    ATENCIÓN: Si habla español, tiene a rodriguez disposición servicios gratuitos de asistencia lingüística. Marcelo al [phone].    We comply with applicable federal civil rights laws and [place] laws. We do not discriminate on the basis of race, color, national origin, age, disability, sex, sexual orientation, or gender identity.            Thank you!     Thank you for choosing [facility]  for your care. Our goal is always to provide you with excellent care.  Hearing back from our patients is one way we can continue to improve our services. Please take a few minutes to complete the written survey that you may receive in the mail after your visit with us. Thank you!             Your Updated Medication List - Protect others around you: Learn how to safely use, store and throw away your medicines at www.disposemymeds.org.          This list is accurate as of 8/6/18  4:25 PM.  Always use your most recent med list.                   Brand Name Dispense Instructions for use Diagnosis    azithromycin 500 MG tablet    ZITHROMAX    3 tablet    Take 1 tablet (500 mg) by mouth daily for 3 doses Take 1 tablet a day for up to 3 days for severe diarrhea    Travel advice encounter       blood glucose monitoring lancets     1 Box    Use to test blood sugar 1 times daily or as directed.  Ok to substitute alternative if insurance prefers.    Type 2 diabetes mellitus without complication, without long-term current use of insulin (H)       blood glucose monitoring meter device kit    no brand specified    1 kit    Use to test blood sugar 1 times daily or as directed.    Type 2 diabetes mellitus without complication, without long-term current use of insulin (H)       blood glucose monitoring test strip    HÉCTOR CONTOUR    100 strip    Use to test blood sugars 1 times daily or as directed.    Type 2 diabetes mellitus without complication, without long-term current use of insulin (H)       cholecalciferol 1000 UNIT tablet    vitamin D3    100 tablet    Take 2 tablets (2,000 Units) by mouth daily    Vitamin deficiency       metFORMIN 500 MG tablet    GLUCOPHAGE    180 tablet    Take 1 tablet (500 mg) by mouth 2 times daily (with meals)    Type 2 diabetes mellitus without complication, without long-term current use of insulin (H)       omeprazole 20 MG tablet     30 tablet    Take 1 tablet (20 mg) by mouth daily Take 30-60 minutes before a meal.    Gastroesophageal reflux disease without  esophagitis

## 2018-08-06 NOTE — NURSING NOTE
"Chief Complaint   Patient presents with     [facility] Clinic     initial /80 (BP Location: Left arm, Cuff Size: Adult Regular)  Temp 97.8  F (36.6  C) (Oral) Estimated body mass index is 28.76 kg/(m^2) as calculated from the following:    Height as of 8/2/18: 5' 6.34\" (1.685 m).    Weight as of 8/2/18: 180 lb (81.6 kg).  BP completed using cuff size: regular.  L  arm      Health Maintenance that is potentially due pending provider review:  NONE    n/a    Jordin Morrow ma  "

## 2018-08-06 NOTE — PROGRESS NOTES
Nurse Note      Itinerary:  [facility]      Departure Date: 8/8/18      Return Date: 9/1/18      Length of Trip 3 weeks      Reason for Travel: Tourism           Urban or rural: both      Accommodations: Hotel        IMMUNIZATION HISTORY  Have you received any immunizations within the past 4 weeks?  No  Have you ever fainted from having your blood drawn or from an injection?  No  Have you ever had a fever reaction to vaccination?  No  Have you ever had any bad reaction or side effect from any vaccination?  No  Have you ever had hepatitis A or B vaccine?  Yes  Do you live (or work closely) with anyone who has AIDS, an AIDS-like condition, any other immune disorder or who is on chemotherapy for cancer?  No  Do you have a family history of immunodeficiency?  No  Have you received any injection of immune globulin or any blood products during the past 12 months?  No    Patient roomed by Jordin Ahumadashawn Ndiaye is a 38 year old male seen today alone for counsultation for international travel to [facility] for Hajj.  Patient will be departing in  2 day(s) and staying for   3 week(s) and  traveling with organized tour group.      Patient itinerary :  will be in the [facility] which presents risk for Dengue Fever, food borne illnesses, motor vehicle accidents and Typhoid. exposure.      Patient's activities will include Hajj.    Patient's country of birth is [place]    Special medical concerns: Type 2 diuabetes  Pre-travel questionnaire was completed by patient and reviewed by provider.     Vitals: /80 (BP Location: Left arm, Cuff Size: Adult Regular)  Temp 97.8  F (36.6  C) (Oral)  BMI= There is no height or weight on file to calculate BMI.    EXAM:  General:  Well-nourished, well-developed in no acute distress.  Appears to be stated age, interacts appropriately and expresses understanding of information given to patient.    Current Outpatient Prescriptions   Medication Sig  Dispense Refill     blood glucose monitoring (HÉCTOR CONTOUR) test strip Use to test blood sugars 1 times daily or as directed. 100 strip 1     blood glucose monitoring (HÉCTOR MICROLET) lancets Use to test blood sugar 1 times daily or as directed.  Ok to substitute alternative if insurance prefers. 1 Box prn     blood glucose monitoring (NO BRAND SPECIFIED) meter device kit Use to test blood sugar 1 times daily or as directed. 1 kit 0     cholecalciferol (VITAMIN D3) 1000 UNIT tablet Take 2 tablets (2,000 Units) by mouth daily 100 tablet 3     metFORMIN (GLUCOPHAGE) 500 MG tablet Take 1 tablet (500 mg) by mouth 2 times daily (with meals) 180 tablet 1     omeprazole 20 MG tablet Take 1 tablet (20 mg) by mouth daily Take 30-60 minutes before a meal. 30 tablet 3     [DISCONTINUED] ORDER FOR DME COUNTOUR NEXT BLOOD STRIPS    Use to test blood sugars 2x daily or as directed 100 strip prn     Patient Active Problem List   Diagnosis     Nonspecific reaction to tuberculin skin test without active tuberculosis     Chronic rhinitis     Vitamin D deficiency     Seasonal allergic rhinitis     Hypertension goal BP (blood pressure) < 140/80     Type 2 diabetes mellitus without complication, without long-term current use of insulin (H)     No Known Allergies      Immunizations discussed include:   Hepatitis A:  Up to date  Hepatitis B: Up to date  Influenza: vaccine is not available  Typhoid: Ordered/given today, risks, benefits and side effects reviewed  Rabies: Declined  Not concerned about risk of disease  Yellow Fever: Not indicated  Japanese Encephalitis: Not indicated  Meningococcus: Ordered/given today, risks, benefits and side effects reviewed  Tetanus/Diphtheria: Ordered/given today, risks, benefits and side effects reviewed  Measles/Mumps/Rubella: Up to date  Cholera: Not needed  Polio: Up to date  Pneumococcal: not indicated  Varicella: Up to date  Zostavax:  Not indicated  Shingrix: Not indicated  HPV:  Not  indicated  TB:  Low risk     Altitude Exposure on this trip: no  Past tolerance to Altitude: na    ASSESSMENT/PLAN:    ICD-10-CM    1. Travel advice encounter Z71.89 MENINGOCOCCAL VACCINE,IM (MENACTRA)     TDAP, IM (10 - 64 YRS) - Adacel     azithromycin (ZITHROMAX) 500 MG tablet     TYPHOID VACCINE, IM   2. Need for vaccination Z23 MENINGOCOCCAL VACCINE,IM (MENACTRA)     TDAP, IM (10 - 64 YRS) - Adacel     TYPHOID VACCINE, IM     I have reviewed general recommendations for safe travel   including: food/water precautions, insect precautions, safer sex   practices given high prevalence of Zika, HIV and other STDs,   roadway safety. Educational materials and Travax report provided.    Malaraia prophylaxis recommended: none  Symptomatic treatment for traveler's diarrhea: azithromycin  Altitude illness prevention and treatment: no      Evacuation insurance advised and resources were provided to patient.    Total visit time 30 minutes  with over 50% of time spent counseling patient as detailed above.    Yaima Abreu CNP

## 2019-02-07 ENCOUNTER — TELEPHONE (OUTPATIENT)
Dept: FAMILY MEDICINE | Facility: CLINIC | Age: 39
End: 2019-02-07

## 2019-02-07 NOTE — LETTER
February 7, 2019      Abdi Ndiaye  [address]  [place] [address]        Dear Abdi,    I care about your health and have reviewed your health plan. I have reviewed your medical conditions, medication list, and lab results and am making recommendations based on this review, to better manage your health.    You are in particular need of attention regarding:  -Diabetes    I am recommending that you:  -schedule a FOLLOWUP OFFICE APPOINTMENT with me.  I will recheck your: A1c test.    Here is a list of Health Maintenance topics that are due now or due soon:  Health Maintenance Due   Topic Date Due     HIV SCREEN (SYSTEM ASSIGNED)  01/01/1997     A1C (Diabetes) Lab - every 6 months  02/02/2019       Please call us at [phone] to address the above recommendations.     Thank you for trusting [facility] and we appreciate the opportunity to serve you.  We look forward to supporting your healthcare needs in the future.    Healthy Regards,    Isac Hare MD/Zoran VAZQUEZ CMA

## 2019-02-07 NOTE — TELEPHONE ENCOUNTER
Needs of attention regarding:  -Diabetes    Health Maintenance Topics with due status: Overdue       Topic Date Due    HIV SCREEN (SYSTEM ASSIGNED) 01/01/1997    A1C Q6 MO 02/02/2019       Communication:  See Letter

## 2019-04-25 DIAGNOSIS — E11.9 TYPE 2 DIABETES MELLITUS WITHOUT COMPLICATION, WITHOUT LONG-TERM CURRENT USE OF INSULIN (H): ICD-10-CM

## 2019-04-25 NOTE — LETTER
April 29, 2019      Abdi Ndiaye  [address]  [place] [address]        Dear Abdi,    I care about your health and have reviewed your health plan. I have reviewed your medical conditions, medication list, and lab results and am making recommendations based on this review, to better manage your health.    You are in particular need of attention regarding:  -Diabetes    I am recommending that you:  -Schedule a Diabetic appointment     Here is a list of Health Maintenance topics that are due now or due soon:  Health Maintenance Due   Topic Date Due     HIV SCREEN (SYSTEM ASSIGNED)  01/01/1997     Preventive Care Visit  04/12/2018     PHQ-2  01/01/2019     A1C (Diabetes) Lab - every 6 months  02/02/2019     Diabetic Foot Exam - yearly  03/13/2019     Creatinine Lab - yearly  03/13/2019     Cholesterol Lab - yearly  03/13/2019     Microalbumin Lab - yearly  03/20/2019       Please call us at [phone] (or use [employer]) to address the above recommendations.     Thank you for trusting [facility] and we appreciate the opportunity to serve you.  We look forward to supporting your healthcare needs in the future.    Healthy Regards,    Isac Hare MD

## 2019-04-25 NOTE — TELEPHONE ENCOUNTER
Medication is being filled for 1 time refill only due to:  Patient needs to be seen because over due for 6 month diabetes follow up appointment.. Joan Hughes RN

## 2019-04-25 NOTE — TELEPHONE ENCOUNTER
Lottie Palm contacted [facility] on 04/25/19 and left a message. If patient calls back please schedule appointment in 1 month for a Diabetic check.      .Lottie CRABTREE    [facility] [place]

## 2019-04-25 NOTE — TELEPHONE ENCOUNTER
"Requested Prescriptions   Pending Prescriptions Disp Refills     CONTOUR NEXT TEST test strip [Pharmacy Med Name: Contour Next Test In Vitro Strip] 25 each 0     Sig: Use to test blood sugars 1 times daily or as directed.       Diabetic Supplies Protocol Failed - 4/25/2019  9:55 AM        Failed - Recent (6 mo) or future (30 days) visit within the authorizing provider's specialty     Patient had office visit in the last 6 months or has a visit in the next 30 days with authorizing provider.  See \"Patient Info\" tab in inbasket, or \"Choose Columns\" in Meds & Orders section of the refill encounter.            Passed - Medication is active on med list        Passed - Patient is 18 years of age or older   Last Written Prescription Date:  3/20/2018  Last Fill Quantity: 100,  # refills: 1   Last office visit: 8/6/2018 with prescribing provider:  DOROTHY Abreu  Future Office Visit:           "

## 2019-04-26 NOTE — TELEPHONE ENCOUNTER
Lottie Palm contacted [facility] on 04/26/19 and left a message. If patient calls back please schedule appointment as soon as possible for a Diabetic check.      .Lottie CRABTREE    [facility] [place]

## 2019-04-29 NOTE — TELEPHONE ENCOUNTER
Contacted patient with interp unable to leave a vm. TC will mail letter.      .Lottie CRABTREE    [facility] [place]

## 2019-07-12 DIAGNOSIS — E11.9 TYPE 2 DIABETES MELLITUS WITHOUT COMPLICATION, WITHOUT LONG-TERM CURRENT USE OF INSULIN (H): ICD-10-CM

## 2019-07-12 NOTE — TELEPHONE ENCOUNTER
"Requested Prescriptions   Pending Prescriptions Disp Refills     CONTOUR NEXT TEST test strip [Pharmacy Med Name: Contour Next Test In Vitro Strip] 25 each 0     Sig: Use to test blood sugars 1 times daily or as directed  Last Written Prescription Date:  4/25/19  Last Fill Quantity: 25,  # refills: 0   Last office visit: 8/6/2018 with prescribing provider:  [facility]  Future Office Visit:           Diabetic Supplies Protocol Failed - 7/12/2019  2:49 PM        Failed - Recent (6 mo) or future (30 days) visit within the authorizing provider's specialty     Patient had office visit in the last 6 months or has a visit in the next 30 days with authorizing provider.  See \"Patient Info\" tab in inbasket, or \"Choose Columns\" in Meds & Orders section of the refill encounter.            Passed - Medication is active on med list        Passed - Patient is 18 years of age or older        Routing refill request to provider for review/approval because:  Gayatri given x1 and patient did not follow up, please advise.    Pt was last seen in clinic on 8/2/18 by Dr. Hare.  A letter was sent to pt to follow up in clinic April 2019.    Nisreen ROBLES RN  EP Triage          "

## 2019-11-01 ENCOUNTER — TRANSFERRED RECORDS (OUTPATIENT)
Dept: HEALTH INFORMATION MANAGEMENT | Facility: CLINIC | Age: 39
End: 2019-11-01

## 2019-11-01 LAB — RETINOPATHY: NORMAL

## 2020-04-14 ENCOUNTER — VIRTUAL VISIT (OUTPATIENT)
Dept: FAMILY MEDICINE | Facility: CLINIC | Age: 40
End: 2020-04-14
Payer: COMMERCIAL

## 2020-04-14 ENCOUNTER — TELEPHONE (OUTPATIENT)
Dept: FAMILY MEDICINE | Facility: CLINIC | Age: 40
End: 2020-04-14

## 2020-04-14 DIAGNOSIS — E11.9 TYPE 2 DIABETES MELLITUS WITHOUT COMPLICATION, WITHOUT LONG-TERM CURRENT USE OF INSULIN (H): Primary | ICD-10-CM

## 2020-04-14 DIAGNOSIS — I10 HYPERTENSION GOAL BP (BLOOD PRESSURE) < 140/80: ICD-10-CM

## 2020-04-14 DIAGNOSIS — K21.9 GASTROESOPHAGEAL REFLUX DISEASE WITHOUT ESOPHAGITIS: ICD-10-CM

## 2020-04-14 DIAGNOSIS — Z13.220 LIPID SCREENING: ICD-10-CM

## 2020-04-14 PROCEDURE — 99442 ZZC PHYSICIAN TELEPHONE EVALUATION 11-20 MIN: CPT | Performed by: FAMILY MEDICINE

## 2020-04-14 RX ORDER — NICOTINE POLACRILEX 4 MG/1
20 GUM, CHEWING ORAL DAILY
Qty: 30 TABLET | Refills: 3 | Status: SHIPPED | OUTPATIENT
Start: 2020-04-14 | End: 2020-06-02

## 2020-04-14 NOTE — Clinical Note
Please help arrange this patient to come to get his blood work done over the next 1 to 2 weeks not very urgent.

## 2020-04-14 NOTE — PROGRESS NOTES
Left vm to schedule a lab appointment.       .Lottie CRABTREE    [facility] [place]

## 2020-04-14 NOTE — TELEPHONE ENCOUNTER
Reason for Call:  call back - appt malorie/Payam today     Detailed comments: Plz call PT back for his 1 pm appt. With Dr. Hare     Phone Number Patient can be reached at: Cell number on file:    Telephone Information:   Mobile [phone]       Best Time: any    Can we leave a detailed message on this number?    Yes    Call taken on 4/14/2020 at 12:54 PM by Glendy Cornell

## 2020-04-14 NOTE — PROGRESS NOTES
"Abdi Ndiaye is a 40 year old male who is being evaluated via a billable telephone visit.      The patient has been notified of following:     \"This telephone visit will be conducted via a call between you and your physician/provider. We have found that certain health care needs can be provided without the need for a physical exam.  This service lets us provide the care you need with a short phone conversation.  If a prescription is necessary we can send it directly to your pharmacy.  If lab work is needed we can place an order for that and you can then stop by our lab to have the test done at a later time.    Telephone visits are billed at different rates depending on your insurance coverage. During this emergency period, for some insurers they may be billed the same as an in-person visit.  Please reach out to your insurance provider with any questions.    If during the course of the call the physician/provider feels a telephone visit is not appropriate, you will not be charged for this service.\"    Patient has given verbal consent for Telephone visit?  Yes    How would you like to obtain your AVS? Mail a copy    Subjective     Abdi Ndiaye is a 40 year old male who presents to clinic today for the following health issues:    Diabetes Follow-up      How often are you checking your blood sugar? Not at all    What concerns do you have today about your diabetes? Recently returned from [place] in November, has not been on any meds     Do you have any of these symptoms? (Select all that apply)  No numbness or tingling in feet.  No redness, sores or blisters on feet.  No complaints of excessive thirst.  No reports of blurry vision.  No significant changes to weight.    Have you had a diabetic eye exam in the last 12 months? Yes- Date of last eye exam: Nov 2019,  Location: [facility]    Patient has been in [place] for the last 6 7 months.  He was very active there so he thought his diabetes is most likely well controlled.  " He is off medication for the last few months.  Denies any new symptoms.  He would like to restart the medication and would like to get omeprazole refilled.  Denies any chest pains no shortness of breath.    BP Readings from Last 2 Encounters:   08/06/18 123/80   08/02/18 130/80     Hemoglobin A1C (%)   Date Value   08/02/2018 6.1 (H)   03/13/2018 6.3 (H)     LDL Cholesterol Calculated (mg/dL)   Date Value   03/13/2018 98   04/12/2017 89                 How many servings of fruits and vegetables do you eat daily?  4 or more    On average, how many sweetened beverages do you drink each day (Examples: soda, juice, sweet tea, etc.  Do NOT count diet or artificially sweetened beverages)?   1    How many days per week do you exercise enough to make your heart beat faster? 4    How many minutes a day do you exercise enough to make your heart beat faster? 30 - 60    How many days per week do you miss taking your medication? 0        GERD/Heartburn  Onset: 6 months - has used omeprazole in the past     Description:     Burning in chest: no     Intensity: moderate    Progression of Symptoms: worsening    Accompanying Signs & Symptoms:  Does it feel like food gets stuck: no   Nausea: no   Vomiting (bloody?): no   Abdominal Pain: YES   Black-Tarry stools: no :  Bloody stools: no     History:   Previous ulcers: no     Precipitating factors:   Caffeine use: no   Alcohol use: no   NSAID/Aspirin use: no   Tobacco use: no   Worse with fatty foods and spicy foods - worse in the mornings      Therapies Tried and outcome:Omeprazole has helped in the past         Reviewed and updated as needed this visit by Provider         Review of Systems   ROS COMP: Constitutional, HEENT, cardiovascular, pulmonary, gi and gu systems are negative, except as otherwise noted.       Objective   Reported vitals:  There were no vitals taken for this visit.   healthy, alert and no distress  PSYCH: Alert and oriented times 3; coherent speech, normal   rate  and volume, able to articulate logical thoughts, able   to abstract reason, no tangential thoughts, no hallucinations   or delusions  His affect is normal  RESP: No cough, no audible wheezing, able to talk in full sentences  Remainder of exam unable to be completed due to telephone visits    Diagnostic Test Results:  Labs reviewed in Epic        Assessment/Plan:  1. Type 2 diabetes mellitus without complication, without long-term current use of insulin (H)  Patient diabetes control is not known at this time however it feels like he has not been taking his medication.  I have refilled his medication and advised him to get his lab work done in the next 1 to 2 weeks.  Once lab work is done we will adjust the medication.  We will follow him up in the clinic in the next 2 to 3 months.  - HEMOGLOBIN A1C; Future  - Lipid panel reflex to direct LDL Fasting; Future  - Albumin Random Urine Quantitative with Creat Ratio; Future  - **Comprehensive metabolic panel FUTURE anytime; Future  - metFORMIN (GLUCOPHAGE) 500 MG tablet; Take 1 tablet (500 mg) by mouth 2 times daily (with meals)  Dispense: 180 tablet; Refill: 0  - Vitamin D Deficiency; Future    2. Hypertension goal BP (blood pressure) < 140/80    -Comprehensive metabolic panel FUTURE anytime; Future    3. Gastroesophageal reflux disease without esophagitis    - omeprazole 20 MG tablet; Take 1 tablet (20 mg) by mouth daily Take 30-60 minutes before a meal.  Dispense: 30 tablet; Refill: 3    4. Lipid screening    - Lipid panel reflex to direct LDL Fasting; Future  - **Comprehensive metabolic panel FUTURE anytime; Future    Follow up in 3 months    Phone call duration:  11 minutes

## 2020-04-24 ENCOUNTER — VIRTUAL VISIT (OUTPATIENT)
Dept: FAMILY MEDICINE | Facility: CLINIC | Age: 40
End: 2020-04-24
Payer: COMMERCIAL

## 2020-04-24 ENCOUNTER — APPOINTMENT (OUTPATIENT)
Dept: INTERPRETER SERVICES | Facility: CLINIC | Age: 40
End: 2020-04-24
Payer: COMMERCIAL

## 2020-04-24 ENCOUNTER — NURSE TRIAGE (OUTPATIENT)
Dept: FAMILY MEDICINE | Facility: CLINIC | Age: 40
End: 2020-04-24

## 2020-04-24 DIAGNOSIS — R07.89 OTHER CHEST PAIN: Primary | ICD-10-CM

## 2020-04-24 PROCEDURE — 99442 ZZC PHYSICIAN TELEPHONE EVALUATION 11-20 MIN: CPT | Performed by: FAMILY MEDICINE

## 2020-04-24 RX ORDER — ACETAMINOPHEN 500 MG
500-1000 TABLET ORAL EVERY 6 HOURS PRN
COMMUNITY
Start: 2020-04-24 | End: 2021-07-07

## 2020-04-24 NOTE — TELEPHONE ENCOUNTER
Covering provider- this patient does not have access to a computer to do oncare or video visit. I did have some problems with being able to understand him/language barrier. Let me know you advice.          Last Sunday Leonel had chest pain on the right side. Since then it has been happening on and off. It lasts a couple second to minutes. He describes it as mild  Pain on Lower left side, sometimes radiates to middle and feels heavy. Feels pain with he takes a deep breath.   No runny nose or cough.  No fever. No pain with exertion but can feel dizzy when he walks. No sweating.     No asthma or heart disease   Yes diabetes.   Yes to nausea. Some problems with spicy food. On omeprazole.     He does have anxiety. He is worried when he sleeps. He thinks this may be a factor.      Annette Dinh RN- Triage [employer]        Reason for Disposition    Difficulty breathing    Additional Information    Negative: SEVERE chest pain    Negative: Pain also present in shoulder(s) or arm(s) or jaw    Negative: Cocaine use within last 3 days    Negative: History of prior 'blood clot' in leg or lungs (i.e., deep vein thrombosis, pulmonary embolism)    Negative: Recent illness requiring prolonged bed rest (i.e., immobilization)    Negative: Hip or leg fracture in past 2 months (e.g, or had cast on leg or ankle)    Negative: Major surgery in the past month    Negative: Recent long-distance travel with prolonged time in car, bus, plane, or train (i.e., within past 2 weeks; 6 or more hours duration)    Negative: Heart beating irregularly or very rapidly    Negative: Chest pain lasting longer than 5 minutes    Negative: Intermittent chest pain and pain has been increasing in severity or frequency    Dizziness or lightheadedness    Negative: Coughing up blood    Negative: Patient sounds very sick or weak to the triager    Negative: Fever > 100.5 F (38.1 C)    Intermittent chest pains persist > 3 days    Negative: Patient wants to be  seen    All other patients with chest pain    Intermittent mild chest pain lasting a few seconds each time    Protocols used: CHEST PAIN-A-[place]

## 2020-04-24 NOTE — PROGRESS NOTES
"Abdi Ndiaye is a 40 year old male who is being evaluated via a billable telephone visit.      The patient has been notified of following:     \"This telephone visit will be conducted via a call between you and your physician/provider. We have found that certain health care needs can be provided without the need for a physical exam.  This service lets us provide the care you need with a short phone conversation.  If a prescription is necessary we can send it directly to your pharmacy.  If lab work is needed we can place an order for that and you can then stop by our lab to have the test done at a later time.    Telephone visits are billed at different rates depending on your insurance coverage. During this emergency period, for some insurers they may be billed the same as an in-person visit.  Please reach out to your insurance provider with any questions.    If during the course of the call the physician/provider feels a telephone visit is not appropriate, you will not be charged for this service.\"    Patient has given verbal consent for Telephone visit?  Yes    How would you like to obtain your AVS? Maurice Reveles     Abdi Ndiaye is a 40 year old male who presents to clinic today for the following health issues:    HPI  CHEST PAIN     Onset: last Sunday 4/19/2020    Description:   Location:  right side  Character: pain on right side of chest when he pushes on chest, middle of chest feels heavy        Intermittent mild chest pain lasting a few seconds each time, in fact it is improving.,  And not as bad now   Radiation: right side of chest radiating towards mostly right shoulder area, although ROM of shoulder, neck is also  ok .        No numbness or tingling or pain shooting down the arm otherwise.    has localized tenderness on rt chest wall and he can trigger the pain.      He thinks he has been pushing on it on and off , so it looked little bit red this morning from rubbing on it, but denies any other " rash or skin lesion as such.      No history of any trauma or injury         denies any history of any drug abuse    Duration: intermittent     Intensity: mild    Progression of Symptoms:  improving    Accompanying Signs & Symptoms:  Shortness of breath: no  Sweating: no  Nausea/vomiting: no  Lightheadedness: no , maybe he felt slight dizzy feeling earlier, when he first had the pain, but is not a frequent problem and no recurrence.   Palpitations: no  Fever/Chills: no  Cough: no  Heartburn: no   No other URI symptoms recently.   History:   Family history of heart disease no  Tobacco use: no    Precipitating factors:   Worse with exertion: no  Worse with deep breaths :  YES, minimal  Related to food: no    Alleviating factors:  none      Triage nurse notes-reviewed as in the chart     Therapies Tried and outcome: none .  He has some Tylenol at home although he did not try anything.         Patient Active Problem List   Diagnosis     Nonspecific reaction to tuberculin skin test without active tuberculosis     Chronic rhinitis     Vitamin D deficiency     Seasonal allergic rhinitis     Hypertension goal BP (blood pressure) < 140/80     Type 2 diabetes mellitus without complication, without long-term current use of insulin (H)     Past Surgical History:   Procedure Laterality Date     NO HISTORY OF SURGERY         Social History     Tobacco Use     Smoking status: Never Smoker     Smokeless tobacco: Never Used   Substance Use Topics     Alcohol use: No     Alcohol/week: 0.0 standard drinks     Family History   Problem Relation Age of Onset     C.A.D. No family hx of      Diabetes No family hx of      Hypertension No family hx of      Cancer - colorectal No family hx of      Prostate Cancer No family hx of      Eye Disorder No family hx of      Thyroid Disease No family hx of      Anesthesia Reaction No family hx of      Blood Disease No family hx of            Reviewed and updated as needed this visit by Provider          Review of Systems   ROS COMP: Constitutional, HEENT, cardiovascular, pulmonary, GI, , musculoskeletal, neuro, skin, endocrine and psych systems are negative, except as otherwise noted.             Assessment/Plan:  Other chest pain    - acetaminophen (TYLENOL) 500 MG tablet; Take 1-2 tablets (500-1,000 mg) by mouth every 6 hours as needed for mild pain  Dispense:      Discussed possible differential diagnosis for her symptoms. sounds like musculoskeletal chest wall pain, as he has clinically no warning symptoms at this time.    In fact he think his symptoms have improved but still there.  He is willing to try some Tylenol that he has at home since he has not really taken anything for the pain, and see if that really works for him or not.  If he needs something stronger he will call us back and let us know, and we can consider may be prescribed NSAIDs.   I also noted that he is a diabetic and is due for his labs.  order has been placed by his PCP.  So I reminded him to make sure to do lab appointment soon, as requested by PCP recently. Talked about warning s/s for which should be seen urgently.     Cares and  treatment discussed follow. up if problem   Patient expressed understanding and agreement with treatment plan. All patient's questions were answered, will let me know if has more later.  Medications: Rx's: Reviewed the potential side effects/complications of medications prescribed.       Phone call duration:  12 minutes    Nena Ceja MD

## 2020-04-24 NOTE — TELEPHONE ENCOUNTER
Patient is scheduled for a telephone visit today with Dr. Ceja. Patient refused to be seen in clinic prefers telephone visit at this time.    .Lottie CRABTREE    [facility] [place]

## 2020-04-24 NOTE — TELEPHONE ENCOUNTER
Per RN who spoke with patient, he preferred a telephone visit.     Routing to team to inform and assist with scheduling. Thank you very much.     Camille Ball RN, BSN  [facility] [place]

## 2020-04-24 NOTE — TELEPHONE ENCOUNTER
Can arrange a phone visit or be seen by face to face provider ( if covid screen negative) may be urgent care if any doubts for covid

## 2020-05-21 ENCOUNTER — TRANSFERRED RECORDS (OUTPATIENT)
Dept: HEALTH INFORMATION MANAGEMENT | Facility: CLINIC | Age: 40
End: 2020-05-21

## 2020-05-21 LAB
CREAT SERPL-MCNC: 0.81 MG/DL (ref 0.72–1.25)
GFR SERPL CREATININE-BSD FRML MDRD: >60 ML/MIN/1.73M2
GLUCOSE SERPL-MCNC: 137 MG/DL (ref 65–100)
POTASSIUM SERPL-SCNC: 3.8 MMOL/L (ref 3.5–5)

## 2020-06-02 ENCOUNTER — APPOINTMENT (OUTPATIENT)
Dept: INTERPRETER SERVICES | Facility: CLINIC | Age: 40
End: 2020-06-02
Payer: COMMERCIAL

## 2020-06-02 ENCOUNTER — OFFICE VISIT (OUTPATIENT)
Dept: FAMILY MEDICINE | Facility: CLINIC | Age: 40
End: 2020-06-02
Payer: COMMERCIAL

## 2020-06-02 VITALS
BODY MASS INDEX: 28.12 KG/M2 | HEART RATE: 89 BPM | TEMPERATURE: 97.6 F | SYSTOLIC BLOOD PRESSURE: 122 MMHG | OXYGEN SATURATION: 97 % | DIASTOLIC BLOOD PRESSURE: 80 MMHG | WEIGHT: 176 LBS

## 2020-06-02 DIAGNOSIS — K21.9 GASTROESOPHAGEAL REFLUX DISEASE WITHOUT ESOPHAGITIS: ICD-10-CM

## 2020-06-02 DIAGNOSIS — R05.9 COUGH: ICD-10-CM

## 2020-06-02 DIAGNOSIS — E11.9 TYPE 2 DIABETES MELLITUS WITHOUT COMPLICATION, WITHOUT LONG-TERM CURRENT USE OF INSULIN (H): Primary | ICD-10-CM

## 2020-06-02 LAB — HBA1C MFR BLD: 6.5 % (ref 0–5.6)

## 2020-06-02 PROCEDURE — 36415 COLL VENOUS BLD VENIPUNCTURE: CPT | Performed by: FAMILY MEDICINE

## 2020-06-02 PROCEDURE — 80053 COMPREHEN METABOLIC PANEL: CPT | Performed by: FAMILY MEDICINE

## 2020-06-02 PROCEDURE — 83036 HEMOGLOBIN GLYCOSYLATED A1C: CPT | Performed by: FAMILY MEDICINE

## 2020-06-02 PROCEDURE — 99214 OFFICE O/P EST MOD 30 MIN: CPT | Performed by: FAMILY MEDICINE

## 2020-06-02 PROCEDURE — 84443 ASSAY THYROID STIM HORMONE: CPT | Performed by: FAMILY MEDICINE

## 2020-06-02 PROCEDURE — 82043 UR ALBUMIN QUANTITATIVE: CPT | Performed by: FAMILY MEDICINE

## 2020-06-02 PROCEDURE — 80061 LIPID PANEL: CPT | Performed by: FAMILY MEDICINE

## 2020-06-02 RX ORDER — FAMOTIDINE 20 MG/1
20 TABLET, FILM COATED ORAL 2 TIMES DAILY
COMMUNITY
End: 2020-06-02

## 2020-06-02 RX ORDER — LANCETS
EACH MISCELLANEOUS
Qty: 100 EACH | Refills: 1 | Status: SHIPPED | OUTPATIENT
Start: 2020-06-02 | End: 2022-01-05

## 2020-06-02 RX ORDER — FAMOTIDINE 20 MG/1
20 TABLET, FILM COATED ORAL 2 TIMES DAILY
Qty: 60 TABLET | Refills: 4 | Status: SHIPPED | OUTPATIENT
Start: 2020-06-02 | End: 2020-08-04

## 2020-06-02 RX ORDER — BLOOD SUGAR DIAGNOSTIC
STRIP MISCELLANEOUS
Qty: 100 STRIP | Status: SHIPPED | OUTPATIENT
Start: 2020-06-02 | End: 2021-06-28

## 2020-06-02 RX ORDER — BLOOD-GLUCOSE METER
EACH MISCELLANEOUS
Qty: 1 KIT | Refills: 0 | Status: SHIPPED | OUTPATIENT
Start: 2020-06-02 | End: 2022-01-05

## 2020-06-02 NOTE — PROGRESS NOTES
Subjective     Abdi Ndiaye is a 40 year old male who presents to clinic today for the following health issues:    HPI   ED/UC Followup:    Facility:  [facility]   Date of visit: 5/21  Reason for visit: Cough   Current Status: Cough is slight better but some mild upper respiratory bronchitis-like symptoms.  He has COVID testing which was negative.  Denies any chest pains no shortness of breath.  He also have underlying gastroesophageal reflux which is slight worse started on famotidine which has helped.  He is checking his blood sugar.  His A1c today 6.5 slight worse but still under reasonably well-controlled.                 Reviewed and updated as needed this visit by Provider         Review of Systems   Constitutional, HEENT, cardiovascular, pulmonary, gi and gu systems are negative, except as otherwise noted.      Objective    /80 (BP Location: Left arm, Patient Position: Chair, Cuff Size: Adult Large)   Pulse 89   Temp 97.6  F (36.4  C) (Tympanic)   Wt 79.8 kg (176 lb)   SpO2 97%   BMI 28.12 kg/m    Body mass index is 28.12 kg/m .  Physical Exam   GENERAL: healthy, alert and no distress  NECK: no adenopathy, no asymmetry, masses, or scars and thyroid normal to palpation  RESP: lungs clear to auscultation - no rales, rhonchi or wheezes  CV: regular rate and rhythm, normal S1 S2, no S3 or S4, no murmur, click or rub, no peripheral edema and peripheral pulses strong  ABDOMEN: soft, nontender, no hepatosplenomegaly, no masses and bowel sounds normal  MS: no gross musculoskeletal defects noted, no edema    Diagnostic Test Results:  Labs reviewed in Epic        Assessment & Plan     1. Type 2 diabetes mellitus without complication, without long-term current use of insulin (H)  Blood sugar stable.  Advised better control with diet and exercise along with metformin.  Labs ordered once done we will follow-up on that.  - Hemoglobin A1c  - Comprehensive metabolic panel  - Lipid panel reflex to  direct LDL Fasting  - TSH  - Albumin Random Urine Quantitative with Creat Ratio  - blood glucose (ACCU-CHEK TAN PLUS) test strip; Use to test blood sugar 2 times daily or as directed.  Ok to substitute alternative if insurance prefers.  Dispense: 100 strip; Refill: prn  - blood glucose monitoring (ACCU-CHEK TAN PLUS) meter device kit; Use to test blood sugar 2 times daily or as directed.  Ok to substitute alternative if insurance prefers.  Dispense: 1 kit; Refill: 0  - blood glucose monitoring (SOFTCLIX) lancets; Use to test blood sugar 2 times daily or as directed.  Ok to substitute alternative if insurance prefers.  Dispense: 100 each; Refill:     2. Gastroesophageal reflux disease without esophagitis  Of mild cough could be acid reflux.  He has some response to Pepcid.  He is advised to use that as discussed.  If symptoms continue he will follow-up.  Warning signs were discussed  - famotidine (PEPCID) 20 MG tablet; Take 1 tablet (20 mg) by mouth 2 times daily  Dispense: 60 tablet; Refill: 4    3. Cough  Patient still continue to have mild cough.  He does not have any fever COVID testing 2 weeks ago was negative.  Due to ongoing symptom I suggested we should check his coag testing again to make sure he does not have positive COVID.  Symptomatic care discussed.  Without fever I do not suggest any antibiotic.  Extensive work-up in ER done.  - Symptomatic COVID-19 Virus (Coronavirus) by PCR; Future       Isac Hare MD  [facility]

## 2020-06-02 NOTE — LETTER
June 4, 2020      Abdi Ndiaye  [address]   [place] [address]        Dear EvelynSenthil,    We are writing to inform you of your test results.    I have reviewed your recent labs. Here are the results:     -Cholesterol levels (LDL,HDL, Triglycerides) are normal.  ADVISE: rechecking in 1 year.   -Liver and gallbladder tests are normal (ALT,AST, Alk phos, bilirubin), kidney function is normal (Cr, GFR), sodium is normal, potassium is normal, calcium is normal, glucose is slight elevated , please take the medication as prescribed.   -A1C (test of diabetes control the last 2-3 months) is at your goal. Please continue with your current plan. Also, you should make an appointment to see me and recheck your A1C test in 6 months.   -TSH (thyroid stimulating hormone) level is normal which indicates normal thyroid function.   -Microalbumin (urine protein) test is normal.  ADVISE: rechecking this annually.       Isac Hare MD   6/3/2020     Resulted Orders   Hemoglobin A1c   Result Value Ref Range    Hemoglobin A1C 6.5 (H) 0 - 5.6 %      Comment:      Normal <5.7% Prediabetes 5.7-6.4%  Diabetes 6.5% or higher - adopted from ADA   consensus guidelines.     Comprehensive metabolic panel   Result Value Ref Range    Sodium 137 133 - 144 mmol/L    Potassium 4.2 3.4 - 5.3 mmol/L    Chloride 106 94 - 109 mmol/L    Carbon Dioxide 28 20 - 32 mmol/L    Anion Gap 3 3 - 14 mmol/L    Glucose 128 (H) 70 - 99 mg/dL      Comment:      Fasting specimen    Urea Nitrogen 11 7 - 30 mg/dL    Creatinine 0.79 0.66 - 1.25 mg/dL    GFR Estimate >90 >60 mL/min/[1.73_m2]      Comment:      Non  GFR Calc  Starting 12/18/2018, serum creatinine based estimated GFR (eGFR) will be   calculated using the Chronic Kidney Disease Epidemiology Collaboration   (CKD-EPI) equation.      GFR Estimate If Black >90 >60 mL/min/[1.73_m2]      Comment:       GFR Calc  Starting 12/18/2018, serum creatinine based estimated  GFR (eGFR) will be   calculated using the Chronic Kidney Disease Epidemiology Collaboration   (CKD-EPI) equation.      Calcium 8.9 8.5 - 10.1 mg/dL    Bilirubin Total 0.3 0.2 - 1.3 mg/dL    Albumin 3.8 3.4 - 5.0 g/dL    Protein Total 8.1 6.8 - 8.8 g/dL    Alkaline Phosphatase 54 40 - 150 U/L    ALT 19 0 - 70 U/L    AST 11 0 - 45 U/L   Lipid panel reflex to direct LDL Fasting   Result Value Ref Range    Cholesterol 180 <200 mg/dL    Triglycerides 185 (H) <150 mg/dL      Comment:      Borderline high:  150-199 mg/dl  High:             200-499 mg/dl  Very high:       >499 mg/dl  Fasting specimen      HDL Cholesterol 41 >39 mg/dL    LDL Cholesterol Calculated 102 (H) <100 mg/dL      Comment:      Above desirable:  100-129 mg/dl  Borderline High:  130-159 mg/dL  High:             160-189 mg/dL  Very high:       >189 mg/dl      Non HDL Cholesterol 139 (H) <130 mg/dL      Comment:      Above Desirable:  130-159 mg/dl  Borderline high:  160-189 mg/dl  High:             190-219 mg/dl  Very high:       >219 mg/dl     TSH   Result Value Ref Range    TSH 1.84 0.40 - 4.00 mU/L   Albumin Random Urine Quantitative with Creat Ratio   Result Value Ref Range    Creatinine Urine 227 mg/dL    Albumin Urine mg/L 10 mg/L    Albumin Urine mg/g Cr 4.41 0 - 17 mg/g Cr       If you have any questions or concerns, please call the clinic at the number listed above.       Sincerely,        Isac Hare MD

## 2020-06-03 LAB
ALBUMIN SERPL-MCNC: 3.8 G/DL (ref 3.4–5)
ALP SERPL-CCNC: 54 U/L (ref 40–150)
ALT SERPL W P-5'-P-CCNC: 19 U/L (ref 0–70)
ANION GAP SERPL CALCULATED.3IONS-SCNC: 3 MMOL/L (ref 3–14)
AST SERPL W P-5'-P-CCNC: 11 U/L (ref 0–45)
BILIRUB SERPL-MCNC: 0.3 MG/DL (ref 0.2–1.3)
BUN SERPL-MCNC: 11 MG/DL (ref 7–30)
CALCIUM SERPL-MCNC: 8.9 MG/DL (ref 8.5–10.1)
CHLORIDE SERPL-SCNC: 106 MMOL/L (ref 94–109)
CHOLEST SERPL-MCNC: 180 MG/DL
CO2 SERPL-SCNC: 28 MMOL/L (ref 20–32)
CREAT SERPL-MCNC: 0.79 MG/DL (ref 0.66–1.25)
CREAT UR-MCNC: 227 MG/DL
GFR SERPL CREATININE-BSD FRML MDRD: >90 ML/MIN/{1.73_M2}
GLUCOSE SERPL-MCNC: 128 MG/DL (ref 70–99)
HDLC SERPL-MCNC: 41 MG/DL
LDLC SERPL CALC-MCNC: 102 MG/DL
MICROALBUMIN UR-MCNC: 10 MG/L
MICROALBUMIN/CREAT UR: 4.41 MG/G CR (ref 0–17)
NONHDLC SERPL-MCNC: 139 MG/DL
POTASSIUM SERPL-SCNC: 4.2 MMOL/L (ref 3.4–5.3)
PROT SERPL-MCNC: 8.1 G/DL (ref 6.8–8.8)
SODIUM SERPL-SCNC: 137 MMOL/L (ref 133–144)
TRIGL SERPL-MCNC: 185 MG/DL
TSH SERPL DL<=0.005 MIU/L-ACNC: 1.84 MU/L (ref 0.4–4)

## 2020-06-04 DIAGNOSIS — R05.9 COUGH: ICD-10-CM

## 2020-06-04 LAB
SARS-COV-2 RNA SPEC QL NAA+PROBE: NOT DETECTED
SPECIMEN SOURCE: NORMAL

## 2020-06-04 PROCEDURE — 99207 ZZC NO BILLABLE SERVICE THIS VISIT: CPT

## 2020-06-04 PROCEDURE — 87635 SARS-COV-2 COVID-19 AMP PRB: CPT | Performed by: FAMILY MEDICINE

## 2020-06-04 PROCEDURE — 99000 SPECIMEN HANDLING OFFICE-LAB: CPT | Performed by: FAMILY MEDICINE

## 2020-06-04 NOTE — LETTER
June 5, 2020        Abdi Ndiaye  [address]   [place] [address]    This letter provides a written record that you were tested for COVID-19 on 6/4/20.   Your result was negative.    This means that we didn t find the virus that causes COVID-19 in your sample. A test may show negative when you do actually have the virus. This can happen when the virus is in the early stages of infection, before you feel illness symptoms.    Even if you don t have symptoms, they may still appear. For safety, it s very important to follow these rules.    Keep yourself away from others (self-isolation):      Stay home. Don t go to work, school or anywhere else.     Stay in your own room (and use your own bathroom), if you can.    Stay away from others in your home. No hugging, kissing or shaking hands. No visitors.    Clean  high touch  surfaces often (doorknobs, counters, handles, etc.). Use a household cleaning spray or wipes.    Cover your mouth and nose with a mask, tissue or washcloth to avoid spreading germs.    Wash your hands and face often with soap and water.    Stay in self-isolation until you meet ALL of the guidelines below:    1. You have had no fever for at least 72 hours (that is 3 full days of no fever without the use of medicine that reduces fevers), AND  2. other symptoms (such as cough, shortness of breath) have gotten better, AND  3. at least 10 days have passed since your symptoms first appeared.    Going back to work  Check with your employer for any guidelines to follow for going back to work.    Employers: This document serves as formal notice that your employee tested negative for COVID-19, as of the testing date shown above.    For questions regarding this letter or your Negative COVID-19 result, call [phone] between 8A to 6:30P (M-F) and 10A to 6:30P (weekends).

## 2020-06-11 ENCOUNTER — TELEPHONE (OUTPATIENT)
Dept: FAMILY MEDICINE | Facility: CLINIC | Age: 40
End: 2020-06-11

## 2020-06-11 NOTE — TELEPHONE ENCOUNTER
Spoke with patient and informed him of Lab results per notes below. Patient verbalized understanding and agrees with plan.           Notes recorded by Yaima Amaya RN on 6/5/2020 at 2:21 PM CDT   Coronavirus (COVID-19) Notification     Your result for COVID-19 is Negative   Letter sent that will serve as a formal notice for your employer         I have reviewed your recent labs. Here are the results:     -Cholesterol levels (LDL,HDL, Triglycerides) are normal.  ADVISE: rechecking in 1 year.   -Liver and gallbladder tests are normal (ALT,AST, Alk phos, bilirubin), kidney function is normal (Cr, GFR), sodium is normal, potassium is normal, calcium is normal, glucose is slight elevated , please take the medication as prescribed.   -A1C (test of diabetes control the last 2-3 months) is at your goal. Please continue with your current plan. Also, you should make an appointment to see me and recheck your A1C test in 6 months.   -TSH (thyroid stimulating hormone) level is normal which indicates normal thyroid function.   -Microalbumin (urine protein) test is normal.  ADVISE: rechecking this annually.       Isac Hare MD   6/3/2020      Camille Ball RN, BSN  [facility] [place]

## 2020-08-04 ENCOUNTER — VIRTUAL VISIT (OUTPATIENT)
Dept: FAMILY MEDICINE | Facility: CLINIC | Age: 40
End: 2020-08-04
Payer: COMMERCIAL

## 2020-08-04 DIAGNOSIS — J20.9 ACUTE BRONCHITIS WITH SYMPTOMS > 10 DAYS: Primary | ICD-10-CM

## 2020-08-04 PROCEDURE — 99213 OFFICE O/P EST LOW 20 MIN: CPT | Mod: 95 | Performed by: FAMILY MEDICINE

## 2020-08-04 RX ORDER — AZITHROMYCIN 250 MG/1
TABLET, FILM COATED ORAL
Qty: 6 TABLET | Refills: 0 | Status: SHIPPED | OUTPATIENT
Start: 2020-08-04 | End: 2021-07-07

## 2020-08-04 NOTE — PROGRESS NOTES
"Abdi Ndiaye is a 40 year old male who is being evaluated via a billable telephone visit.      The patient has been notified of following:     \"This telephone visit will be conducted via a call between you and your physician/provider. We have found that certain health care needs can be provided without the need for a physical exam.  This service lets us provide the care you need with a short phone conversation.  If a prescription is necessary we can send it directly to your pharmacy.  If lab work is needed we can place an order for that and you can then stop by our lab to have the test done at a later time.    Telephone visits are billed at different rates depending on your insurance coverage. During this emergency period, for some insurers they may be billed the same as an in-person visit.  Please reach out to your insurance provider with any questions.    If during the course of the call the physician/provider feels a telephone visit is not appropriate, you will not be charged for this service.\"    Patient has given verbal consent for Telephone visit?  Yes    What phone number would you like to be contacted at? [phone]    How would you like to obtain your AVS? Mail a copy    Subjective     Abdi Ndiaye is a 40 year old male who presents via phone visit today for the following health issues:    HPI    Acute Illness   Acute illness concerns: cough   Onset: in the morning after waking up     Fever: no     Chills/Sweats: no     Headache (location?): no     Sinus Pressure:no    Conjunctivitis:  no    Ear Pain: no    Rhinorrhea: no     Congestion: no     Sore Throat: no      Cough: YES-productive of yellow sputum    Wheeze: no     Decreased Appetite: no     Nausea: no     Vomiting: no     Diarrhea:  no     Dysuria/Freq.: no     Fatigue/Achiness: no     Sick/Strep Exposure: no      Therapies Tried and outcome: none     Symptoms improve but slight cough and congestion in th am no fever.no sick contact, his COVID " was checked previously it was negative            Reviewed and updated as needed this visit by Provider         Review of Systems   Constitutional, HEENT, cardiovascular, pulmonary, gi and gu systems are negative, except as otherwise noted.       Objective   Reported vitals:  There were no vitals taken for this visit.   healthy and alert  PSYCH: Alert and oriented times 3; coherent speech, normal   rate and volume, able to articulate logical thoughts, able   to abstract reason, no tangential thoughts, no hallucinations   or delusions  His affect is normal  RESP: No cough, no audible wheezing, able to talk in full sentences  Remainder of exam unable to be completed due to telephone visits    Diagnostic Test Results:  Labs reviewed in Epic        Assessment/Plan:    1. Acute bronchitis with symptoms > 10 days  Patient has upper respiratory symptoms without any sick contact he is feeling slight congestion and cough in the morning.  Slight productive but denies any fever chills.  We will start him on azithromycin and he is advised to follow-up in the clinic in 1 week so that we can evaluate if any improvement.  If any breathing difficulty or any fever he will let us know.  - azithromycin (ZITHROMAX) 250 MG tablet; Two tablets first day, then one tablet daily for four days.  Dispense: 6 tablet; Refill: 0        Phone call duration:  6 minutes

## 2020-08-11 ENCOUNTER — OFFICE VISIT (OUTPATIENT)
Dept: FAMILY MEDICINE | Facility: CLINIC | Age: 40
End: 2020-08-11
Payer: COMMERCIAL

## 2020-08-11 VITALS
DIASTOLIC BLOOD PRESSURE: 80 MMHG | OXYGEN SATURATION: 97 % | HEART RATE: 78 BPM | BODY MASS INDEX: 27.96 KG/M2 | TEMPERATURE: 98.3 F | SYSTOLIC BLOOD PRESSURE: 124 MMHG | WEIGHT: 175 LBS

## 2020-08-11 DIAGNOSIS — K21.9 GASTROESOPHAGEAL REFLUX DISEASE WITHOUT ESOPHAGITIS: Primary | ICD-10-CM

## 2020-08-11 PROCEDURE — 99213 OFFICE O/P EST LOW 20 MIN: CPT | Performed by: FAMILY MEDICINE

## 2020-08-13 DIAGNOSIS — K21.9 GASTROESOPHAGEAL REFLUX DISEASE WITHOUT ESOPHAGITIS: ICD-10-CM

## 2020-08-13 PROCEDURE — 87338 HPYLORI STOOL AG IA: CPT | Performed by: FAMILY MEDICINE

## 2020-08-14 LAB — H PYLORI AG STL QL IA: NEGATIVE

## 2020-10-14 ENCOUNTER — ALLIED HEALTH/NURSE VISIT (OUTPATIENT)
Dept: NURSING | Facility: CLINIC | Age: 40
End: 2020-10-14
Payer: COMMERCIAL

## 2020-10-14 DIAGNOSIS — Z23 NEED FOR PROPHYLACTIC VACCINATION AND INOCULATION AGAINST INFLUENZA: Primary | ICD-10-CM

## 2020-10-14 PROCEDURE — 99207 PR NO CHARGE NURSE ONLY: CPT

## 2020-10-14 PROCEDURE — 90686 IIV4 VACC NO PRSV 0.5 ML IM: CPT

## 2020-10-14 PROCEDURE — 90471 IMMUNIZATION ADMIN: CPT

## 2020-12-03 ENCOUNTER — VIRTUAL VISIT (OUTPATIENT)
Dept: FAMILY MEDICINE | Facility: CLINIC | Age: 40
End: 2020-12-03
Payer: COMMERCIAL

## 2020-12-03 DIAGNOSIS — K21.9 GASTROESOPHAGEAL REFLUX DISEASE WITHOUT ESOPHAGITIS: ICD-10-CM

## 2020-12-03 DIAGNOSIS — J02.9 ACUTE PHARYNGITIS, UNSPECIFIED ETIOLOGY: Primary | ICD-10-CM

## 2020-12-03 PROCEDURE — 99213 OFFICE O/P EST LOW 20 MIN: CPT | Mod: 25 | Performed by: FAMILY MEDICINE

## 2020-12-03 RX ORDER — AMOXICILLIN 875 MG
875 TABLET ORAL 2 TIMES DAILY
Qty: 20 TABLET | Refills: 0 | Status: SHIPPED | OUTPATIENT
Start: 2020-12-03 | End: 2021-07-07

## 2020-12-03 NOTE — PROGRESS NOTES
"Abdi Ndiaye is a 40 year old male who is being evaluated via a billable telephone visit.      The patient has been notified of following:     \"This telephone visit will be conducted via a call between you and your physician/provider. We have found that certain health care needs can be provided without the need for a physical exam.  This service lets us provide the care you need with a short phone conversation.  If a prescription is necessary we can send it directly to your pharmacy.  If lab work is needed we can place an order for that and you can then stop by our lab to have the test done at a later time.    Telephone visits are billed at different rates depending on your insurance coverage. During this emergency period, for some insurers they may be billed the same as an in-person visit.  Please reach out to your insurance provider with any questions.    If during the course of the call the physician/provider feels a telephone visit is not appropriate, you will not be charged for this service.\"    Patient has given verbal consent for Telephone visit?  Yes    What phone number would you like to be contacted at? [phone]    How would you like to obtain your AVS? Maurice    Subjective     Abdi Ndiaye is a 40 year old male who presents via phone visit today for the following health issues:    HPI     Acute Illness  Acute illness concerns: throat pain when swallowing - worse in the morning. Better with warm water/tea  Onset/Duration: 1 week  Symptoms:  Fever: no  Chills/Sweats: YES  Headache (location?): no  Sinus Pressure: no  Conjunctivitis:  no  Ear Pain: YES- little bit - blocked   Rhinorrhea: no  Congestion: no  Sore Throat: YES  Cough: no  Wheeze: no  Decreased Appetite: no  Nausea: no  Vomiting: no  Diarrhea: no  Dysuria/Freq.: no  Dysuria or Hematuria: no  Fatigue/Achiness: no  Sick/Strep Exposure: no  Therapies tried and outcome: None    Review of Systems   Constitutional, HEENT, cardiovascular, " pulmonary, gi and gu systems are negative, except as otherwise noted.       Objective          Vitals:  No vitals were obtained today due to virtual visit.    healthy, alert and no distress  PSYCH: Alert and oriented times 3; coherent speech, normal   rate and volume, able to articulate logical thoughts, able   to abstract reason, no tangential thoughts, no hallucinations   or delusions  His affect is normal  RESP: No cough, no audible wheezing, able to talk in full sentences  Remainder of exam unable to be completed due to telephone visits            Assessment & Plan     Acute pharyngitis, unspecified etiology  Has no concerning exposure to COVID-19, has sore throat with ear pian, has mild temp elevation, will have him to try abx   - amoxicillin (AMOXIL) 875 MG tablet; Take 1 tablet (875 mg) by mouth 2 times daily    Gastroesophageal reflux disease without esophagitis  Encouraged him to try double dose of  PPI for next 2 weeks           FUTURE APPOINTMENTS:       - Follow-up visit in 2 weeks if not improving     No follow-ups on file.    Beck Buckner MD  [facility]    Phone call duration:  13 minutes

## 2021-03-09 ENCOUNTER — AMBULATORY - HEALTHEAST (OUTPATIENT)
Dept: NURSING | Facility: CLINIC | Age: 41
End: 2021-03-09

## 2021-03-30 ENCOUNTER — AMBULATORY - HEALTHEAST (OUTPATIENT)
Dept: NURSING | Facility: CLINIC | Age: 41
End: 2021-03-30

## 2021-06-27 DIAGNOSIS — E11.9 TYPE 2 DIABETES MELLITUS WITHOUT COMPLICATION, WITHOUT LONG-TERM CURRENT USE OF INSULIN (H): ICD-10-CM

## 2021-06-28 ENCOUNTER — TRANSFERRED RECORDS (OUTPATIENT)
Dept: HEALTH INFORMATION MANAGEMENT | Facility: CLINIC | Age: 41
End: 2021-06-28

## 2021-06-28 LAB — RETINOPATHY: NORMAL

## 2021-06-28 NOTE — TELEPHONE ENCOUNTER
Medication is being filled for 1 time refill only due to:  Patient needs to be seen because diabetes follow up..     Nisreen ROBLES RN  EP Triage

## 2021-06-30 RX ORDER — BLOOD SUGAR DIAGNOSTIC
STRIP MISCELLANEOUS
Qty: 100 STRIP | Refills: 0 | Status: SHIPPED | OUTPATIENT
Start: 2021-06-30 | End: 2021-08-23

## 2021-06-30 NOTE — TELEPHONE ENCOUNTER
Routing refill request to provider for review/approval because:  Gayatri given x1 and patient did not follow up, please advise  Appt scheduled 7/7/21

## 2021-06-30 NOTE — TELEPHONE ENCOUNTER
Spoke to pt, appt has been scheduled for 7/7/21. Will you please approve one time refill?  Thanks.  Zoran VAZQUEZ CMA

## 2021-07-07 ENCOUNTER — OFFICE VISIT (OUTPATIENT)
Dept: FAMILY MEDICINE | Facility: CLINIC | Age: 41
End: 2021-07-07
Payer: COMMERCIAL

## 2021-07-07 VITALS
WEIGHT: 186 LBS | TEMPERATURE: 95.7 F | OXYGEN SATURATION: 97 % | RESPIRATION RATE: 16 BRPM | DIASTOLIC BLOOD PRESSURE: 70 MMHG | BODY MASS INDEX: 29.89 KG/M2 | HEART RATE: 70 BPM | SYSTOLIC BLOOD PRESSURE: 138 MMHG | HEIGHT: 66 IN

## 2021-07-07 DIAGNOSIS — Z11.4 SCREENING FOR HIV (HUMAN IMMUNODEFICIENCY VIRUS): ICD-10-CM

## 2021-07-07 DIAGNOSIS — E11.9 TYPE 2 DIABETES MELLITUS WITHOUT COMPLICATION, WITHOUT LONG-TERM CURRENT USE OF INSULIN (H): ICD-10-CM

## 2021-07-07 DIAGNOSIS — K21.9 GASTROESOPHAGEAL REFLUX DISEASE WITHOUT ESOPHAGITIS: ICD-10-CM

## 2021-07-07 DIAGNOSIS — I10 HYPERTENSION GOAL BP (BLOOD PRESSURE) < 140/80: Primary | ICD-10-CM

## 2021-07-07 DIAGNOSIS — Z13.220 SCREENING FOR HYPERLIPIDEMIA: ICD-10-CM

## 2021-07-07 PROBLEM — K21.00 GASTROESOPHAGEAL REFLUX DISEASE WITH ESOPHAGITIS, UNSPECIFIED WHETHER HEMORRHAGE: Status: ACTIVE | Noted: 2021-07-07

## 2021-07-07 LAB
ALBUMIN SERPL-MCNC: 3.7 G/DL (ref 3.4–5)
ALP SERPL-CCNC: 55 U/L (ref 40–150)
ALT SERPL W P-5'-P-CCNC: 23 U/L (ref 0–70)
ANION GAP SERPL CALCULATED.3IONS-SCNC: 8 MMOL/L (ref 3–14)
AST SERPL W P-5'-P-CCNC: 15 U/L (ref 0–45)
BILIRUB SERPL-MCNC: 0.3 MG/DL (ref 0.2–1.3)
BUN SERPL-MCNC: 18 MG/DL (ref 7–30)
CALCIUM SERPL-MCNC: 9.1 MG/DL (ref 8.5–10.1)
CHLORIDE SERPL-SCNC: 105 MMOL/L (ref 94–109)
CHOLEST SERPL-MCNC: 185 MG/DL
CO2 SERPL-SCNC: 26 MMOL/L (ref 20–32)
CREAT SERPL-MCNC: 0.84 MG/DL (ref 0.66–1.25)
CREAT UR-MCNC: 82 MG/DL
GFR SERPL CREATININE-BSD FRML MDRD: >90 ML/MIN/{1.73_M2}
GLUCOSE SERPL-MCNC: 148 MG/DL (ref 70–99)
HBA1C MFR BLD: 6.3 % (ref 0–5.6)
HDLC SERPL-MCNC: 50 MG/DL
LDLC SERPL CALC-MCNC: 119 MG/DL
MICROALBUMIN UR-MCNC: <5 MG/L
MICROALBUMIN/CREAT UR: NORMAL MG/G CR (ref 0–17)
NONHDLC SERPL-MCNC: 135 MG/DL
POTASSIUM SERPL-SCNC: 4.1 MMOL/L (ref 3.4–5.3)
PROT SERPL-MCNC: 7.6 G/DL (ref 6.8–8.8)
SODIUM SERPL-SCNC: 139 MMOL/L (ref 133–144)
TRIGL SERPL-MCNC: 82 MG/DL

## 2021-07-07 PROCEDURE — 80061 LIPID PANEL: CPT | Performed by: FAMILY MEDICINE

## 2021-07-07 PROCEDURE — 83036 HEMOGLOBIN GLYCOSYLATED A1C: CPT | Performed by: FAMILY MEDICINE

## 2021-07-07 PROCEDURE — 87389 HIV-1 AG W/HIV-1&-2 AB AG IA: CPT | Performed by: FAMILY MEDICINE

## 2021-07-07 PROCEDURE — 99214 OFFICE O/P EST MOD 30 MIN: CPT | Performed by: FAMILY MEDICINE

## 2021-07-07 PROCEDURE — 80053 COMPREHEN METABOLIC PANEL: CPT | Performed by: FAMILY MEDICINE

## 2021-07-07 PROCEDURE — 36415 COLL VENOUS BLD VENIPUNCTURE: CPT | Performed by: FAMILY MEDICINE

## 2021-07-07 PROCEDURE — 82043 UR ALBUMIN QUANTITATIVE: CPT | Performed by: FAMILY MEDICINE

## 2021-07-07 ASSESSMENT — PAIN SCALES - GENERAL: PAINLEVEL: NO PAIN (0)

## 2021-07-07 ASSESSMENT — MIFFLIN-ST. JEOR: SCORE: 1691.2

## 2021-07-07 NOTE — LETTER
July 8, 2021      Abdi Ndiaye  [address]   [place] [address]        Dear ,    I have reviewed your recent labs. Here are the results:     -LDL(bad) cholesterol level is elevated which can increase your heart disease risk.  A diet high in fat and simple carbohydrates, genetics and being overweight can contribute to this. ADVISE: exercising 150 minutes of aerobic exercise per week (30 minutes for 5 days per week or 50 minutes for 3 days per week are options) and eating a low saturated fat/low carbohydrate diet are helpful to improve this. In 6 months, you should recheck your fasting cholesterol panel by scheduling a lab-only appointment.   -Liver and gallbladder tests (ALT,AST, Alk phos,bilirubin) are normal.   -Kidney function (GFR) is normal.   -Sodium is normal.   -Potassium is normal.   -Calcium is normal.   -Glucose is elevated due to your diabetes.   -A1C (test of diabetes control the last 2-3 months) is at your goal. Please continue with your current plan. Also, you should make an appointment to see me and recheck your A1C test in 6 months. Continue the medications.   -Microalbumin (urine protein) test is normal.  ADVISE: rechecking this annually.         Resulted Orders   HEMOGLOBIN A1C   Result Value Ref Range    Hemoglobin A1C 6.3 (H) 0 - 5.6 %      Comment:      Normal <5.7% Prediabetes 5.7-6.4%  Diabetes 6.5% or higher - adopted from ADA   consensus guidelines.     Lipid panel reflex to direct LDL Fasting   Result Value Ref Range    Cholesterol 185 <200 mg/dL    Triglycerides 82 <150 mg/dL      Comment:      Fasting specimen    HDL Cholesterol 50 >39 mg/dL    LDL Cholesterol Calculated 119 (H) <100 mg/dL      Comment:      Above desirable:  100-129 mg/dl  Borderline High:  130-159 mg/dL  High:             160-189 mg/dL  Very high:       >189 mg/dl      Non HDL Cholesterol 135 (H) <130 mg/dL      Comment:      Above Desirable:  130-159 mg/dl  Borderline high:  160-189  mg/dl  High:             190-219 mg/dl  Very high:       >219 mg/dl     Albumin Random Urine Quantitative with Creat Ratio   Result Value Ref Range    Creatinine Urine 82 mg/dL    Albumin Urine mg/L <5 mg/L    Albumin Urine mg/g Cr Unable to calculate due to low value 0 - 17 mg/g Cr   Comprehensive metabolic panel   Result Value Ref Range    Sodium 139 133 - 144 mmol/L    Potassium 4.1 3.4 - 5.3 mmol/L    Chloride 105 94 - 109 mmol/L    Carbon Dioxide 26 20 - 32 mmol/L    Anion Gap 8 3 - 14 mmol/L    Glucose 148 (H) 70 - 99 mg/dL      Comment:      Fasting specimen    Urea Nitrogen 18 7 - 30 mg/dL    Creatinine 0.84 0.66 - 1.25 mg/dL    GFR Estimate >90 >60 mL/min/[1.73_m2]      Comment:      Non  GFR Calc  Starting 12/18/2018, serum creatinine based estimated GFR (eGFR) will be   calculated using the Chronic Kidney Disease Epidemiology Collaboration   (CKD-EPI) equation.      GFR Estimate If Black >90 >60 mL/min/[1.73_m2]      Comment:       GFR Calc  Starting 12/18/2018, serum creatinine based estimated GFR (eGFR) will be   calculated using the Chronic Kidney Disease Epidemiology Collaboration   (CKD-EPI) equation.      Calcium 9.1 8.5 - 10.1 mg/dL    Bilirubin Total 0.3 0.2 - 1.3 mg/dL    Albumin 3.7 3.4 - 5.0 g/dL    Protein Total 7.6 6.8 - 8.8 g/dL    Alkaline Phosphatase 55 40 - 150 U/L    ALT 23 0 - 70 U/L    AST 15 0 - 45 U/L       If you have any questions or concerns, please call the clinic at the number listed above.       Sincerely,      Isac Hare MD

## 2021-07-07 NOTE — PROGRESS NOTES
"    Assessment & Plan     Screening for HIV (human immunodeficiency virus)    - HIV Antigen Antibody Combo    Screening for hyperlipidemia    - Lipid panel reflex to direct LDL Fasting  - Comprehensive metabolic panel    Hypertension goal BP (blood pressure) < 140/80  Well-controlled not on any medication    Type 2 diabetes mellitus without complication, without long-term current use of insulin (H)  Globin A1c 6.3.  He has been on Metformin which is helping advised to continue the medication follow-up in 6 months.  - HEMOGLOBIN A1C  - Lipid panel reflex to direct LDL Fasting  - Albumin Random Urine Quantitative with Creat Ratio  - Comprehensive metabolic panel  - metFORMIN (GLUCOPHAGE) 500 MG tablet  Dispense: 180 tablet; Refill: 1    Gastroesophageal reflux disease without esophagitis  Acid reflux symptoms advised to use omeprazole minutes worse even doubled her over otherwise continue 20 mg.  - omeprazole (PRILOSEC) 20 MG DR capsule  Dispense: 90 capsule; Refill: 1           BMI:   Estimated body mass index is 29.75 kg/m  as calculated from the following:    Height as of this encounter: 1.684 m (5' 6.3\").    Weight as of this encounter: 84.4 kg (186 lb).     Isac Hare MD  [facility] MARNI Patton is a 41 year old who presents for the following health issues     HPI     Diabetes Follow-up  Patient denies any side effects he has been eating healthy.  He has been on Metformin but not taking for the last few days last hemoglobin A1c was checked outside felt it was still above 6.  Denies any chest pains no shortness of breath.  Has been out of Metformin for a couple of weeks   How often are you checking your blood sugar? Two times daily  Blood sugar testing frequency justification:  Adjustment of medication(s)  What time of day are you checking your blood sugars (select all that apply)?  Before meals and At bedtime  Have you had any blood sugars above 200?  No  Have you had any " "blood sugars below 70?  No    What symptoms do you notice when your blood sugar is low?  None    What concerns do you have today about your diabetes? None     Do you have any of these symptoms? (Select all that apply)  Burning in feet    Have you had a diabetic eye exam in the last 12 months? Yes- Date of last eye exam: [facility],  Location: June 28 2021         BP Readings from Last 2 Encounters:   07/07/21 138/70   08/11/20 124/80     Hemoglobin A1C (%)   Date Value   06/02/2020 6.5 (H)   08/02/2018 6.1 (H)     LDL Cholesterol Calculated (mg/dL)   Date Value   06/02/2020 102 (H)   03/13/2018 98                 How many servings of fruits and vegetables do you eat daily?  2-3    On average, how many sweetened beverages do you drink each day (Examples: soda, juice, sweet tea, etc.  Do NOT count diet or artificially sweetened beverages)?   0    How many days per week do you exercise enough to make your heart beat faster? 7    How many minutes a day do you exercise enough to make your heart beat faster? 30 - 60    How many days per week do you miss taking your medication? 0    She continues to complain of acid reflux symptoms complain of some discomfort in the epigastric area after eating food.  Takes on and off omeprazole which does help    Review of Systems   Constitutional, HEENT, cardiovascular, pulmonary, gi and gu systems are negative, except as otherwise noted.      Objective    /70   Pulse 70   Temp 95.7  F (35.4  C) (Tympanic)   Resp 16   Ht 1.684 m (5' 6.3\")   Wt 84.4 kg (186 lb)   SpO2 97%   BMI 29.75 kg/m    Body mass index is 29.75 kg/m .  Physical Exam   GENERAL: healthy, alert and no distress  NECK: no adenopathy, no asymmetry, masses, or scars and thyroid normal to palpation  RESP: lungs clear to auscultation - no rales, rhonchi or wheezes  CV: regular rate and rhythm, normal S1 S2, no S3 or S4, no murmur, click or rub, no peripheral edema and peripheral pulses strong  ABDOMEN: " soft, nontender, no hepatosplenomegaly, no masses and bowel sounds normal  MS: no gross musculoskeletal defects noted, no edema

## 2021-07-08 LAB — HIV 1+2 AB+HIV1 P24 AG SERPL QL IA: NONREACTIVE

## 2021-08-22 DIAGNOSIS — E11.9 TYPE 2 DIABETES MELLITUS WITHOUT COMPLICATION, WITHOUT LONG-TERM CURRENT USE OF INSULIN (H): ICD-10-CM

## 2021-08-23 RX ORDER — BLOOD SUGAR DIAGNOSTIC
STRIP MISCELLANEOUS
Qty: 100 STRIP | Refills: 1 | Status: SHIPPED | OUTPATIENT
Start: 2021-08-23 | End: 2021-12-21

## 2021-09-26 DIAGNOSIS — K21.9 GASTROESOPHAGEAL REFLUX DISEASE WITHOUT ESOPHAGITIS: ICD-10-CM

## 2021-09-28 RX ORDER — FAMOTIDINE 20 MG/1
TABLET, FILM COATED ORAL
Qty: 60 TABLET | Refills: 0 | OUTPATIENT
Start: 2021-09-28

## 2021-12-20 DIAGNOSIS — K21.9 GASTROESOPHAGEAL REFLUX DISEASE WITHOUT ESOPHAGITIS: ICD-10-CM

## 2021-12-20 DIAGNOSIS — E11.9 TYPE 2 DIABETES MELLITUS WITHOUT COMPLICATION, WITHOUT LONG-TERM CURRENT USE OF INSULIN (H): ICD-10-CM

## 2021-12-21 RX ORDER — BLOOD SUGAR DIAGNOSTIC
STRIP MISCELLANEOUS
Qty: 100 STRIP | Refills: 1 | Status: SHIPPED | OUTPATIENT
Start: 2021-12-21

## 2022-01-05 ENCOUNTER — OFFICE VISIT (OUTPATIENT)
Dept: FAMILY MEDICINE | Facility: CLINIC | Age: 42
End: 2022-01-05
Payer: COMMERCIAL

## 2022-01-05 VITALS
TEMPERATURE: 97.8 F | DIASTOLIC BLOOD PRESSURE: 82 MMHG | RESPIRATION RATE: 12 BRPM | WEIGHT: 183.6 LBS | HEIGHT: 66 IN | OXYGEN SATURATION: 100 % | HEART RATE: 68 BPM | SYSTOLIC BLOOD PRESSURE: 128 MMHG | BODY MASS INDEX: 29.51 KG/M2

## 2022-01-05 DIAGNOSIS — I10 HYPERTENSION GOAL BP (BLOOD PRESSURE) < 140/80: Primary | ICD-10-CM

## 2022-01-05 DIAGNOSIS — E11.9 TYPE 2 DIABETES MELLITUS WITHOUT COMPLICATION, WITHOUT LONG-TERM CURRENT USE OF INSULIN (H): ICD-10-CM

## 2022-01-05 LAB — HBA1C MFR BLD: 6.7 % (ref 0–5.6)

## 2022-01-05 PROCEDURE — 36415 COLL VENOUS BLD VENIPUNCTURE: CPT | Performed by: FAMILY MEDICINE

## 2022-01-05 PROCEDURE — 83036 HEMOGLOBIN GLYCOSYLATED A1C: CPT | Performed by: FAMILY MEDICINE

## 2022-01-05 PROCEDURE — 99214 OFFICE O/P EST MOD 30 MIN: CPT | Performed by: FAMILY MEDICINE

## 2022-01-05 RX ORDER — BLOOD-GLUCOSE METER
EACH MISCELLANEOUS
Qty: 1 KIT | Refills: 0 | Status: SHIPPED | OUTPATIENT
Start: 2022-01-05

## 2022-01-05 RX ORDER — LANCETS
EACH MISCELLANEOUS
Qty: 100 EACH | Refills: 1 | Status: SHIPPED | OUTPATIENT
Start: 2022-01-05

## 2022-01-05 RX ORDER — BLOOD SUGAR DIAGNOSTIC
STRIP MISCELLANEOUS
Qty: 100 STRIP | Refills: 1 | Status: SHIPPED | OUTPATIENT
Start: 2022-01-05

## 2022-01-05 ASSESSMENT — MIFFLIN-ST. JEOR: SCORE: 1675.93

## 2022-01-05 NOTE — PROGRESS NOTES
"  Assessment & Plan     Type 2 diabetes mellitus without complication, without long-term current use of insulin (H)  Hemoglobin A1c is slightly worse however still under the range we will keep him on the same dose of medication.  Follow-up in 6 months for recheck lifestyle changes discussed  - Hemoglobin A1c; Future  - Hemoglobin A1c  - metFORMIN (GLUCOPHAGE) 500 MG tablet; Take 1 tablet (500 mg) by mouth 2 times daily (with meals)  - blood glucose (ACCU-CHEK TAN PLUS) test strip; Use to test blood sugar 2 times daily or as directed.  Ok to substitute alternative if insurance prefers.  - blood glucose monitoring (ACCU-CHEK TAN PLUS) meter device kit; Use to test blood sugar 2 times daily or as directed.  Ok to substitute alternative if insurance prefers.  - blood glucose monitoring (SOFTCLIX) lancets; Use to test blood sugar 2 times daily or as directed.  Ok to substitute alternative if insurance prefers.    Hypertension goal BP (blood pressure) < 140/80                 BMI:   Estimated body mass index is 29.33 kg/m  as calculated from the following:    Height as of this encounter: 1.685 m (5' 6.34\").    Weight as of this encounter: 83.3 kg (183 lb 9.6 oz).     Isac Hare MD  [facility] MARNI Patton is a 42 year old who presents for the following health issues     HPI     Diabetes Follow-up  He checks his blood sugar once or twice a day most of the numbers are under 130/140 range.  Denies any other symptoms.  Lifestyle is somewhat sedentary  How often are you checking your blood sugar? Two times daily  Blood sugar testing frequency justification:  Adjustment of medication(s)  What time of day are you checking your blood sugars (select all that apply)?  Before and after meals  Have you had any blood sugars above 200?  No-130's-140's  Have you had any blood sugars below 70?  No    What symptoms do you notice when your blood sugar is low?  None    What concerns do you have " today about your diabetes? None     Do you have any of these symptoms? (Select all that apply)  No numbness or tingling in feet.  No redness, sores or blisters on feet.  No complaints of excessive thirst.  No reports of blurry vision.  No significant changes to weight.      BP Readings from Last 2 Encounters:   01/05/22 128/82   07/07/21 138/70     Hemoglobin A1C POCT (%)   Date Value   07/07/2021 6.3 (H)   06/02/2020 6.5 (H)     LDL Cholesterol Calculated (mg/dL)   Date Value   07/07/2021 119 (H)   06/02/2020 102 (H)                 How many servings of fruits and vegetables do you eat daily?  0-1-2    On average, how many sweetened beverages do you drink each day (Examples: soda, juice, sweet tea, etc.  Do NOT count diet or artificially sweetened beverages)?   0    How many days per week do you exercise enough to make your heart beat faster? 3 or less    How many minutes a day do you exercise enough to make your heart beat faster? 30 - 60    How many days per week do you miss taking your medication? 0    GERD/Heartburn  Onset/Duration: x ongoing -1-2 years currently on omeprazole w/o help   Description: Right sided epigastric pain   Intensity: mild, moderate  Progression of Symptoms: worsening  Accompanying Signs & Symptoms:  Does it feel like food gets stuck or trouble swallowing: no  Nausea: YES  Vomiting (bloody?): YES- no blood   Abdominal Pain: YES  Black-Tarry stools: no  Bloody stools: no  History:  Previous similar episodes: YES  Previous ulcers: no  Precipitating factors:   Caffeine use: no  Alcohol use: no  NSAID/Aspirin use: no  Tobacco use: no  Worse with fatty foods and spicy foods.  Alleviating factors: None  Therapies tried and outcome:             Lifestyle changes: None            Medications: Omeprazole (Prilosec)      Review of Systems   Constitutional, HEENT, cardiovascular, pulmonary, gi and gu systems are negative, except as otherwise noted.      Objective    /82 (BP Location: Right  "arm, Cuff Size: Adult Regular)   Pulse 68   Temp 97.8  F (36.6  C) (Tympanic)   Resp 12   Ht 1.685 m (5' 6.34\")   Wt 83.3 kg (183 lb 9.6 oz)   SpO2 100%   BMI 29.33 kg/m    Body mass index is 29.33 kg/m .  Physical Exam   GENERAL: healthy, alert and no distress  NECK: no adenopathy, no asymmetry, masses, or scars and thyroid normal to palpation  RESP: lungs clear to auscultation - no rales, rhonchi or wheezes  CV: regular rate and rhythm, normal S1 S2, no S3 or S4, no murmur, click or rub, no peripheral edema and peripheral pulses strong  ABDOMEN: soft, nontender, no hepatosplenomegaly, no masses and bowel sounds normal  MS: no gross musculoskeletal defects noted, no edema      "

## 2023-01-22 ENCOUNTER — LAB REQUISITION (OUTPATIENT)
Dept: LAB | Facility: CLINIC | Age: 43
End: 2023-01-22
Payer: COMMERCIAL

## 2023-01-22 DIAGNOSIS — A41.9 SEPSIS, UNSPECIFIED ORGANISM (H): ICD-10-CM

## 2023-02-24 DIAGNOSIS — K21.9 GASTROESOPHAGEAL REFLUX DISEASE WITHOUT ESOPHAGITIS: ICD-10-CM

## 2024-05-13 ENCOUNTER — TELEPHONE (OUTPATIENT)
Dept: FAMILY MEDICINE | Facility: CLINIC | Age: 44
End: 2024-05-13
Payer: COMMERCIAL

## 2024-05-13 NOTE — TELEPHONE ENCOUNTER
Reason for Call:  Appointment Request    Patient requesting this type of appt:  [facility] Clinic - vaccinations etc prior to traveling to [facility] on 05/21/2024    Requested provider:  [facility] at [facility]    Reason patient unable to be scheduled: Not within requested timeframe    When does patient want to be seen/preferred time:  asap    Comments: Traveling to [facility] 05/21/2024, has been to travel clinic before. No openings until after pt departure date.     Okay to leave a detailed message?: Yes at Home number on file [phone] (home)    Call taken on 5/13/2024 at 9:54 AM by Yenny Monteiro

## 2024-12-10 NOTE — PROGRESS NOTES
Subjective     Abdi Ndiaye is a 40 year old male who presents to clinic today for the following health issues:    HPI       Gastrointestinal symptoms      Duration: 2+ months     Description:           REFLUX SYMPTOMS - regurgitation of food, acid taste in mouth, nausea      Intensity:  moderate    Accompanying signs and symptoms:  Pressure around rib area, belching     History  Previous {similar problem: YES  Previous evaluation:  none    Aggravating factors: meals and spicy foods    Alleviating factors:NONE    Other Therapies tried: Pepcid, Omeprazole     Patient notices symptoms are improved with the use of omeprazole he would like to continue that.  Symptom throat irritation which has improved.  Recent episode of bronchitis which has significantly better after use of antibiotic.        Reviewed and updated as needed this visit by Provider         Review of Systems   Constitutional, HEENT, cardiovascular, pulmonary, gi and gu systems are negative, except as otherwise noted.      Objective    /80   Pulse 78   Temp 98.3  F (36.8  C) (Tympanic)   Wt 79.4 kg (175 lb)   SpO2 97%   BMI 27.96 kg/m    Body mass index is 27.96 kg/m .  Physical Exam   GENERAL: healthy, alert and no distress  NECK: no adenopathy, no asymmetry, masses, or scars and thyroid normal to palpation  RESP: lungs clear to auscultation - no rales, rhonchi or wheezes  CV: regular rate and rhythm, normal S1 S2, no S3 or S4, no murmur, click or rub, no peripheral edema and peripheral pulses strong  ABDOMEN: soft, nontender, no hepatosplenomegaly, no masses and bowel sounds normal  MS: no gross musculoskeletal defects noted, no edema    Diagnostic Test Results:  Labs reviewed in Epic        Assessment & Plan     1. Gastroesophageal reflux disease without esophagitis  Patient ongoing symptoms sometimes worse we will get H. pylori to make sure there is not any issue with that.  He is advised to continue omeprazole.  Warning signs were  discussed with the patient.  Follow-up in 6 months for recheck  - Helicobacter pylori Antigen Stool; Future  - omeprazole (PRILOSEC) 20 MG DR capsule; Take 1 capsule (20 mg) by mouth daily  Dispense: 90 capsule; Refill: 1         [place]ac Hare [place]  [facility] [place]   Opzelura Pregnancy And Lactation Text: There is insufficient data to evaluate drug-associated risk for major birth defects, miscarriage, or other adverse maternal or fetal outcomes.  There is a pregnancy registry that monitors pregnancy outcomes in pregnant persons exposed to the medication during pregnancy.  It is unknown if this medication is excreted in breast milk.  Do not breastfeed during treatment and for about 4 weeks after the last dose. Siliq Pregnancy And Lactation Text: The risk during pregnancy and breastfeeding is uncertain with this medication. Low Dose Naltrexone Pregnancy And Lactation Text: Naltrexone is pregnancy category C.  There have been no adequate and well-controlled studies in pregnant women.  It should be used in pregnancy only if the potential benefit justifies the potential risk to the fetus.   Limited data indicates that naltrexone is minimally excreted into breastmilk. Topical Clindamycin Pregnancy And Lactation Text: This medication is Pregnancy Category B and is considered safe during pregnancy. It is unknown if it is excreted in breast milk. Tremfya Counseling: I discussed with the patient the risks of guselkumab including but not limited to immunosuppression, serious infections, and drug reactions.  The patient understands that monitoring is required including a PPD at baseline and must alert us or the primary physician if symptoms of infection or other concerning signs are noted. Isotretinoin Counseling: Patient should get monthly blood tests, not donate blood, not drive at night if vision affected, not share medication, and not undergo elective surgery for 6 months after tx completed. Side effects reviewed, pt to contact office should one occur. Solaraze Counseling:  I discussed with the patient the risks of Solaraze including but not limited to erythema, scaling, itching, weeping, crusting, and pain. Klisyri Counseling:  I discussed with the patient the risks of Klisyri including but not limited to erythema, scaling, itching, weeping, crusting, and pain. Sarecycline Pregnancy And Lactation Text: This medication is Pregnancy Category D and not consider safe during pregnancy. It is also excreted in breast milk. Wartpeel Counseling:  I discussed with the patient the risks of Wartpeel including but not limited to erythema, scaling, itching, weeping, crusting, and pain. Doxepin Counseling:  Patient advised that the medication is sedating and not to drive a car after taking this medication. Patient informed of potential adverse effects including but not limited to dry mouth, urinary retention, and blurry vision.  The patient verbalized understanding of the proper use and possible adverse effects of doxepin.  All of the patient's questions and concerns were addressed. Rinvoq Pregnancy And Lactation Text: Based on animal studies, Rinvoq may cause embryo-fetal harm when administered to pregnant women.  The medication should not be used in pregnancy.  Breastfeeding is not recommended during treatment and for 6 days after the last dose. Griseofulvin Pregnancy And Lactation Text: This medication is Pregnancy Category X and is known to cause serious birth defects. It is unknown if this medication is excreted in breast milk but breast feeding should be avoided. Azathioprine Pregnancy And Lactation Text: This medication is Pregnancy Category D and isn't considered safe during pregnancy. It is unknown if this medication is excreted in breast milk. Zyclara Pregnancy And Lactation Text: This medication is Pregnancy Category C. It is unknown if this medication is excreted in breast milk. Oxybutynin Counseling:  I discussed with the patient the risks of oxybutynin including but not limited to skin rash, drowsiness, dry mouth, difficulty urinating, and blurred vision. Adbry Counseling: I discussed with the patient the risks of tralokinumab including but not limited to eye infection and irritation, cold sores, injection site reactions, worsening of asthma, allergic reactions and increased risk of parasitic infection.  Live vaccines should be avoided while taking tralokinumab. The patient understands that monitoring is required and they must alert us or the primary physician if symptoms of infection or other concerning signs are noted. Bactrim Counseling:  I discussed with the patient the risks of sulfa antibiotics including but not limited to GI upset, allergic reaction, drug rash, diarrhea, dizziness, photosensitivity, and yeast infections.  Rarely, more serious reactions can occur including but not limited to aplastic anemia, agranulocytosis, methemoglobinemia, blood dyscrasias, liver or kidney failure, lung infiltrates or desquamative/blistering drug rashes. Tranexamic Acid Pregnancy And Lactation Text: It is unknown if this medication is safe during pregnancy or breast feeding. Colchicine Pregnancy And Lactation Text: This medication is Pregnancy Category C and isn't considered safe during pregnancy. It is excreted in breast milk. Prednisone Counseling:  I discussed with the patient the risks of prolonged use of prednisone including but not limited to weight gain, insomnia, osteoporosis, mood changes, diabetes, susceptibility to infection, glaucoma and high blood pressure.  In cases where prednisone use is prolonged, patients should be monitored with blood pressure checks, serum glucose levels and an eye exam.  Additionally, the patient may need to be placed on GI prophylaxis, PCP prophylaxis, and calcium and vitamin D supplementation and/or a bisphosphonate.  The patient verbalized understanding of the proper use and the possible adverse effects of prednisone.  All of the patient's questions and concerns were addressed. Enbrel Pregnancy And Lactation Text: This medication is Pregnancy Category B and is considered safe during pregnancy. It is unknown if this medication is excreted in breast milk. Azelaic Acid Pregnancy And Lactation Text: This medication is considered safe during pregnancy and breast feeding. Siliq Counseling:  I discussed with the patient the risks of Siliq including but not limited to new or worsening depression, suicidal thoughts and behavior, immunosuppression, malignancy, posterior leukoencephalopathy syndrome, and serious infections.  The patient understands that monitoring is required including a PPD at baseline and must alert us or the primary physician if symptoms of infection or other concerning signs are noted. There is also a special program designed to monitor depression which is required with Siliq. Opzelura Counseling:  I discussed with the patient the risks of Opzelura including but not limited to nasopharngitis, bronchitis, ear infection, eosinophila, hives, diarrhea, folliculitis, tonsillitis, and rhinorrhea.  Taken orally, this medication has been linked to serious infections; higher rate of mortality; malignancy and lymphoproliferative disorders; major adverse cardiovascular events; thrombosis; thrombocytopenia, anemia, and neutropenia; and lipid elevations. Niacinamide Counseling: I recommended taking niacin or niacinamide, also know as vitamin B3, twice daily. Recent evidence suggests that taking vitamin B3 (500 mg twice daily) can reduce the risk of actinic keratoses and non-melanoma skin cancers. Side effects of vitamin B3 include flushing and headache. Odomzo Counseling- I discussed with the patient the risks of Odomzo including but not limited to nausea, vomiting, diarrhea, constipation, weight loss, changes in the sense of taste, decreased appetite, muscle spasms, and hair loss.  The patient verbalized understanding of the proper use and possible adverse effects of Odomzo.  All of the patient's questions and concerns were addressed. Topical Clindamycin Counseling: Patient counseled that this medication may cause skin irritation or allergic reactions.  In the event of skin irritation, the patient was advised to reduce the amount of the drug applied or use it less frequently.   The patient verbalized understanding of the proper use and possible adverse effects of clindamycin.  All of the patient's questions and concerns were addressed. Drysol Counseling:  I discussed with the patient the risks of drysol/aluminum chloride including but not limited to skin rash, itching, irritation, burning. Erythromycin Pregnancy And Lactation Text: This medication is Pregnancy Category B and is considered safe during pregnancy. It is also excreted in breast milk. Bexarotene Pregnancy And Lactation Text: This medication is Pregnancy Category X and should not be given to women who are pregnant or may become pregnant. This medication should not be used if you are breast feeding. Sarecycline Counseling: Patient advised regarding possible photosensitivity and discoloration of the teeth, skin, lips, tongue and gums.  Patient instructed to avoid sunlight, if possible.  When exposed to sunlight, patients should wear protective clothing, sunglasses, and sunscreen.  The patient was instructed to call the office immediately if the following severe adverse effects occur:  hearing changes, easy bruising/bleeding, severe headache, or vision changes.  The patient verbalized understanding of the proper use and possible adverse effects of sarecycline.  All of the patient's questions and concerns were addressed. Topical Sulfur Applications Pregnancy And Lactation Text: This medication is Pregnancy Category C and has an unknown safety profile during pregnancy. It is unknown if this topical medication is excreted in breast milk. Azathioprine Counseling:  I discussed with the patient the risks of azathioprine including but not limited to myelosuppression, immunosuppression, hepatotoxicity, lymphoma, and infections.  The patient understands that monitoring is required including baseline LFTs, Creatinine, possible TPMP genotyping and weekly CBCs for the first month and then every 2 weeks thereafter.  The patient verbalized understanding of the proper use and possible adverse effects of azathioprine.  All of the patient's questions and concerns were addressed. Cimetidine Pregnancy And Lactation Text: This medication is Pregnancy Category B and is considered safe during pregnancy. It is also excreted in breast milk and breast feeding isn't recommended. Rhofade Pregnancy And Lactation Text: This medication has not been assigned a Pregnancy Risk Category. It is unknown if the medication is excreted in breast milk. Rinvoq Counseling: I discussed with the patient the risks of Rinvoq therapy including but not limited to upper respiratory tract infections, shingles, cold sores, bronchitis, nausea, cough, fever, acne, and headache. Live vaccines should be avoided.  This medication has been linked to serious infections; higher rate of mortality; malignancy and lymphoproliferative disorders; major adverse cardiovascular events; thrombosis; thrombocytopenia, anemia, and neutropenia; lipid elevations; liver enzyme elevations; and gastrointestinal perforations. Zyclara Counseling:  I discussed with the patient the risks of imiquimod including but not limited to erythema, scaling, itching, weeping, crusting, and pain.  Patient understands that the inflammatory response to imiquimod is variable from person to person and was educated regarded proper titration schedule.  If flu-like symptoms develop, patient knows to discontinue the medication and contact us. Otezla Pregnancy And Lactation Text: This medication is Pregnancy Category C and it isn't known if it is safe during pregnancy. It is unknown if it is excreted in breast milk. Dutasteride Male Counseling: Dustasteride Counseling:  I discussed with the patient the risks of use of dutasteride including but not limited to decreased libido, decreased ejaculate volume, and gynecomastia. Women who can become pregnant should not handle medication.  All of the patient's questions and concerns were addressed. Colchicine Counseling:  Patient counseled regarding adverse effects including but not limited to stomach upset (nausea, vomiting, stomach pain, or diarrhea).  Patient instructed to limit alcohol consumption while taking this medication.  Colchicine may reduce blood counts especially with prolonged use.  The patient understands that monitoring of kidney function and blood counts may be required, especially at baseline. The patient verbalized understanding of the proper use and possible adverse effects of colchicine.  All of the patient's questions and concerns were addressed. Griseofulvin Counseling:  I discussed with the patient the risks of griseofulvin including but not limited to photosensitivity, cytopenia, liver damage, nausea/vomiting and severe allergy.  The patient understands that this medication is best absorbed when taken with a fatty meal (e.g., ice cream or french fries). Methotrexate Pregnancy And Lactation Text: This medication is Pregnancy Category X and is known to cause fetal harm. This medication is excreted in breast milk. Enbrel Counseling:  I discussed with the patient the risks of etanercept including but not limited to myelosuppression, immunosuppression, autoimmune hepatitis, demyelinating diseases, lymphoma, and infections.  The patient understands that monitoring is required including a PPD at baseline and must alert us or the primary physician if symptoms of infection or other concerning signs are noted. Azithromycin Pregnancy And Lactation Text: This medication is considered safe during pregnancy and is also secreted in breast milk. Tranexamic Acid Counseling:  Patient advised of the small risk of bleeding problems with tranexamic acid. They were also instructed to call if they developed any nausea, vomiting or diarrhea. All of the patient's questions and concerns were addressed. Rituxan Pregnancy And Lactation Text: This medication is Pregnancy Category C and it isn't know if it is safe during pregnancy. It is unknown if this medication is excreted in breast milk but similar antibodies are known to be excreted. Azelaic Acid Counseling: Patient counseled that medicine may cause skin irritation and to avoid applying near the eyes.  In the event of skin irritation, the patient was advised to reduce the amount of the drug applied or use it less frequently.   The patient verbalized understanding of the proper use and possible adverse effects of azelaic acid.  All of the patient's questions and concerns were addressed. Niacinamide Pregnancy And Lactation Text: These medications are considered safe during pregnancy. Libtayo Pregnancy And Lactation Text: This medication is contraindicated in pregnancy and when breast feeding. Ivermectin Pregnancy And Lactation Text: This medication is Pregnancy Category C and it isn't known if it is safe during pregnancy. It is also excreted in breast milk. 5-Fu Pregnancy And Lactation Text: This medication is Pregnancy Category X and contraindicated in pregnancy and in women who may become pregnant. It is unknown if this medication is excreted in breast milk. Erythromycin Counseling:  I discussed with the patient the risks of erythromycin including but not limited to GI upset, allergic reaction, drug rash, diarrhea, increase in liver enzymes, and yeast infections. Tazorac Pregnancy And Lactation Text: This medication is not safe during pregnancy. It is unknown if this medication is excreted in breast milk. Rifampin Pregnancy And Lactation Text: This medication is Pregnancy Category C and it isn't know if it is safe during pregnancy. It is also excreted in breast milk and should not be used if you are breast feeding. Bexarotene Counseling:  I discussed with the patient the risks of bexarotene including but not limited to hair loss, dry lips/skin/eyes, liver abnormalities, hyperlipidemia, pancreatitis, depression/suicidal ideation, photosensitivity, drug rash/allergic reactions, hypothyroidism, anemia, leukopenia, infection, cataracts, and teratogenicity.  Patient understands that they will need regular blood tests to check lipid profile, liver function tests, white blood cell count, thyroid function tests and pregnancy test if applicable. Topical Sulfur Applications Counseling: Topical Sulfur Counseling: Patient counseled that this medication may cause skin irritation or allergic reactions.  In the event of skin irritation, the patient was advised to reduce the amount of the drug applied or use it less frequently.   The patient verbalized understanding of the proper use and possible adverse effects of topical sulfur application.  All of the patient's questions and concerns were addressed. Imiquimod Counseling:  I discussed with the patient the risks of imiquimod including but not limited to erythema, scaling, itching, weeping, crusting, and pain.  Patient understands that the inflammatory response to imiquimod is variable from person to person and was educated regarded proper titration schedule.  If flu-like symptoms develop, patient knows to discontinue the medication and contact us. Taltz Counseling: I discussed with the patient the risks of ixekizumab including but not limited to immunosuppression, serious infections, worsening of inflammatory bowel disease and drug reactions.  The patient understands that monitoring is required including a PPD at baseline and must alert us or the primary physician if symptoms of infection or other concerning signs are noted. Rhofade Counseling: Rhofade is a topical medication which can decrease superficial blood flow where applied. Side effects are uncommon and include stinging, redness and allergic reactions. Cimetidine Counseling:  I discussed with the patient the risks of Cimetidine including but not limited to gynecomastia, headache, diarrhea, nausea, drowsiness, arrhythmias, pancreatitis, skin rashes, psychosis, bone marrow suppression and kidney toxicity. Zoryve Pregnancy And Lactation Text: It is unknown if this medication can cause problems during pregnancy and breastfeeding. Dutasteride Pregnancy And Lactation Text: This medication is absolutely contraindicated in women, especially during pregnancy and breast feeding. Feminization of male fetuses is possible if taking while pregnant. Olumiant Pregnancy And Lactation Text: Based on animal studies, Olumiant may cause embryo-fetal harm when administered to pregnant women.  The medication should not be used in pregnancy.  Breastfeeding is not recommended during treatment. Dupixent Pregnancy And Lactation Text: This medication likely crosses the placenta but the risk for the fetus is uncertain. This medication is excreted in breast milk. Otezla Counseling: The side effects of Otezla were discussed with the patient, including but not limited to worsening or new depression, weight loss, diarrhea, nausea, upper respiratory tract infection, and headache. Patient instructed to call the office should any adverse effect occur.  The patient verbalized understanding of the proper use and possible adverse effects of Otezla.  All the patient's questions and concerns were addressed. Azithromycin Counseling:  I discussed with the patient the risks of azithromycin including but not limited to GI upset, allergic reaction, drug rash, diarrhea, and yeast infections. Aklief Pregnancy And Lactation Text: It is unknown if this medication is safe to use during pregnancy.  It is unknown if this medication is excreted in breast milk.  Breastfeeding women should use the topical cream on the smallest area of the skin for the shortest time needed while breastfeeding.  Do not apply to nipple and areola. Thalidomide Pregnancy And Lactation Text: This medication is Pregnancy Category X and is absolutely contraindicated during pregnancy. It is unknown if it is excreted in breast milk. Methotrexate Counseling:  Patient counseled regarding adverse effects of methotrexate including but not limited to nausea, vomiting, abnormalities in liver function tests. Patients may develop mouth sores, rash, diarrhea, and abnormalities in blood counts. The patient understands that monitoring is required including LFT's and blood counts.  There is a rare possibility of scarring of the liver and lung problems that can occur when taking methotrexate. Persistent nausea, loss of appetite, pale stools, dark urine, cough, and shortness of breath should be reported immediately. Patient advised to discontinue methotrexate treatment at least three months before attempting to become pregnant.  I discussed the need for folate supplements while taking methotrexate.  These supplements can decrease side effects during methotrexate treatment. The patient verbalized understanding of the proper use and possible adverse effects of methotrexate.  All of the patient's questions and concerns were addressed. Nsaids Counseling: NSAID Counseling: I discussed with the patient that NSAIDs should be taken with food. Prolonged use of NSAIDs can result in the development of stomach ulcers.  Patient advised to stop taking NSAIDs if abdominal pain occurs.  The patient verbalized understanding of the proper use and possible adverse effects of NSAIDs.  All of the patient's questions and concerns were addressed. Ivermectin Counseling:  Patient instructed to take medication on an empty stomach with a full glass of water.  Patient informed of potential adverse effects including but not limited to nausea, diarrhea, dizziness, itching, and swelling of the extremities or lymph nodes.  The patient verbalized understanding of the proper use and possible adverse effects of ivermectin.  All of the patient's questions and concerns were addressed. 5-Fu Counseling: 5-Fluorouracil Counseling:  I discussed with the patient the risks of 5-fluorouracil including but not limited to erythema, scaling, itching, weeping, crusting, and pain. Libtayo Counseling- I discussed with the patient the risks of Libtayo including but not limited to nausea, vomiting, diarrhea, and bone or muscle pain.  The patient verbalized understanding of the proper use and possible adverse effects of Libtayo.  All of the patient's questions and concerns were addressed. Doxycycline Pregnancy And Lactation Text: This medication is Pregnancy Category D and not consider safe during pregnancy. It is also excreted in breast milk but is considered safe for shorter treatment courses. Tazorac Counseling:  Patient advised that medication is irritating and drying.  Patient may need to apply sparingly and wash off after an hour before eventually leaving it on overnight.  The patient verbalized understanding of the proper use and possible adverse effects of tazorac.  All of the patient's questions and concerns were addressed. Acitretin Pregnancy And Lactation Text: This medication is Pregnancy Category X and should not be given to women who are pregnant or may become pregnant in the future. This medication is excreted in breast milk. Rituxan Counseling:  I discussed with the patient the risks of Rituxan infusions. Side effects can include infusion reactions, severe drug rashes including mucocutaneous reactions, reactivation of latent hepatitis and other infections and rarely progressive multifocal leukoencephalopathy.  All of the patient's questions and concerns were addressed. Spironolactone Pregnancy And Lactation Text: This medication can cause feminization of the male fetus and should be avoided during pregnancy. The active metabolite is also found in breast milk. Mirvaso Counseling: Mirvaso is a topical medication which can decrease superficial blood flow where applied. Side effects are uncommon and include stinging, redness and allergic reactions. Topical Steroids Applications Pregnancy And Lactation Text: Most topical steroids are considered safe to use during pregnancy and lactation.  Any topical steroid applied to the breast or nipple should be washed off before breastfeeding. Hydroquinone Pregnancy And Lactation Text: This medication has not been assigned a Pregnancy Risk Category but animal studies failed to show danger with the topical medication. It is unknown if the medication is excreted in breast milk. Rifampin Counseling: I discussed with the patient the risks of rifampin including but not limited to liver damage, kidney damage, red-orange body fluids, nausea/vomiting and severe allergy. Qbrexza Pregnancy And Lactation Text: There is no available data on Qbrexza use in pregnant women.  There is no available data on Qbrexza use in lactation. Olumiant Counseling: I discussed with the patient the risks of Olumiant therapy including but not limited to upper respiratory tract infections, shingles, cold sores, and nausea. Live vaccines should be avoided.  This medication has been linked to serious infections; higher rate of mortality; malignancy and lymphoproliferative disorders; major adverse cardiovascular events; thrombosis; gastrointestinal perforations; neutropenia; lymphopenia; anemia; liver enzyme elevations; and lipid elevations. Zoryve Counseling:  I discussed with the patient that Zoryve is not for use in the eyes, mouth or vagina. The most commonly reported side effects include diarrhea, headache, insomnia, application site pain, upper respiratory tract infections, and urinary tract infections.  All of the patient's questions and concerns were addressed. Finasteride Male Counseling: Finasteride Counseling:  I discussed with the patient the risks of use of finasteride including but not limited to decreased libido, decreased ejaculate volume, gynecomastia, and depression. Women should not handle medication.  All of the patient's questions and concerns were addressed. Cantharidin Pregnancy And Lactation Text: This medication has not been proven safe during pregnancy. It is unknown if this medication is excreted in breast milk. Oral Minoxidil Pregnancy And Lactation Text: This medication should only be used when clearly needed if you are pregnant, attempting to become pregnant or breast feeding. Cyclosporine Pregnancy And Lactation Text: This medication is Pregnancy Category C and it isn't know if it is safe during pregnancy. This medication is excreted in breast milk. Aklief counseling:  Patient advised to apply a pea-sized amount only at bedtime and wait 30 minutes after washing their face before applying.  If too drying, patient may add a non-comedogenic moisturizer.  The most commonly reported side effects including irritation, redness, scaling, dryness, stinging, burning, itching, and increased risk of sunburn.  The patient verbalized understanding of the proper use and possible adverse effects of retinoids.  All of the patient's questions and concerns were addressed. Clofazimine Counseling:  I discussed with the patient the risks of clofazimine including but not limited to skin and eye pigmentation, liver damage, nausea/vomiting, gastrointestinal bleeding and allergy. Thalidomide Counseling: I discussed with the patient the risks of thalidomide including but not limited to birth defects, anxiety, weakness, chest pain, dizziness, cough and severe allergy. Dupixent Counseling: I discussed with the patient the risks of dupilumab including but not limited to eye infection and irritation, cold sores, injection site reactions, worsening of asthma, allergic reactions and increased risk of parasitic infection.  Live vaccines should be avoided while taking dupilumab. Dupilumab will also interact with certain medications such as warfarin and cyclosporine. The patient understands that monitoring is required and they must alert us or the primary physician if symptoms of infection or other concerning signs are noted. Doxycycline Counseling:  Patient counseled regarding possible photosensitivity and increased risk for sunburn.  Patient instructed to avoid sunlight, if possible.  When exposed to sunlight, patients should wear protective clothing, sunglasses, and sunscreen.  The patient was instructed to call the office immediately if the following severe adverse effects occur:  hearing changes, easy bruising/bleeding, severe headache, or vision changes.  The patient verbalized understanding of the proper use and possible adverse effects of doxycycline.  All of the patient's questions and concerns were addressed. Spironolactone Counseling: Patient advised regarding risks of diarrhea, abdominal pain, hyperkalemia, birth defects (for female patients), liver toxicity and renal toxicity. The patient may need blood work to monitor liver and kidney function and potassium levels while on therapy. The patient verbalized understanding of the proper use and possible adverse effects of spironolactone.  All of the patient's questions and concerns were addressed. Glycopyrrolate Pregnancy And Lactation Text: This medication is Pregnancy Category B and is considered safe during pregnancy. It is unknown if it is excreted breast milk. Cantharidin Counseling:  I discussed with the patient the risks of Cantharidin including but not limited to pain, redness, burning, itching, and blistering. Nsaids Pregnancy And Lactation Text: These medications are considered safe up to 30 weeks gestation. It is excreted in breast milk. Hydroquinone Counseling:  Patient advised that medication may result in skin irritation, lightening (hypopigmentation), dryness, and burning.  In the event of skin irritation, the patient was advised to reduce the amount of the drug applied or use it less frequently.  Rarely, spots that are treated with hydroquinone can become darker (pseudoochronosis).  Should this occur, patient instructed to stop medication and call the office. The patient verbalized understanding of the proper use and possible adverse effects of hydroquinone.  All of the patient's questions and concerns were addressed. Acitretin Counseling:  I discussed with the patient the risks of acitretin including but not limited to hair loss, dry lips/skin/eyes, liver damage, hyperlipidemia, depression/suicidal ideation, photosensitivity.  Serious rare side effects can include but are not limited to pancreatitis, pseudotumor cerebri, bony changes, clot formation/stroke/heart attack.  Patient understands that alcohol is contraindicated since it can result in liver toxicity and significantly prolong the elimination of the drug by many years. Stelara Counseling:  I discussed with the patient the risks of ustekinumab including but not limited to immunosuppression, malignancy, posterior leukoencephalopathy syndrome, and serious infections.  The patient understands that monitoring is required including a PPD at baseline and must alert us or the primary physician if symptoms of infection or other concerning signs are noted. Qbrexza Counseling:  I discussed with the patient the risks of Qbrexza including but not limited to headache, mydriasis, blurred vision, dry eyes, nasal dryness, dry mouth, dry throat, dry skin, urinary hesitation, and constipation.  Local skin reactions including erythema, burning, stinging, and itching can also occur. Quinolones Pregnancy And Lactation Text: This medication is Pregnancy Category C and it isn't know if it is safe during pregnancy. It is also excreted in breast milk. Topical Steroids Counseling: I discussed with the patient that prolonged use of topical steroids can result in the increased appearance of superficial blood vessels (telangiectasias), lightening (hypopigmentation) and thinning of the skin (atrophy).  Patient understands to avoid using high potency steroids in skin folds, the groin or the face.  The patient verbalized understanding of the proper use and possible adverse effects of topical steroids.  All of the patient's questions and concerns were addressed. Litfulo Pregnancy And Lactation Text: Based on animal studies, Lifulo may cause embryo-fetal harm when administered to pregnant women.  The medication should not be used in pregnancy.  Breastfeeding is not recommended during treatment. Oral Minoxidil Counseling- I discussed with the patient the risks of oral minoxidil including but not limited to shortness of breath, swelling of the feet or ankles, dizziness, lightheadedness, unwanted hair growth and allergic reaction.  The patient verbalized understanding of the proper use and possible adverse effects of oral minoxidil.  All of the patient's questions and concerns were addressed. Calcipotriene Pregnancy And Lactation Text: The use of this medication during pregnancy or lactation is not recommended as there is insufficient data. Sski Pregnancy And Lactation Text: This medication is Pregnancy Category D and isn't considered safe during pregnancy. It is excreted in breast milk. Finasteride Pregnancy And Lactation Text: This medication is absolutely contraindicated during pregnancy. It is unknown if it is excreted in breast milk. Cyclosporine Counseling:  I discussed with the patient the risks of cyclosporine including but not limited to hypertension, gingival hyperplasia,myelosuppression, immunosuppression, liver damage, kidney damage, neurotoxicity, lymphoma, and serious infections. The patient understands that monitoring is required including baseline blood pressure, CBC, CMP, lipid panel and uric acid, and then 1-2 times monthly CMP and blood pressure. Terbinafine Counseling: Patient counseling regarding adverse effects of terbinafine including but not limited to headache, diarrhea, rash, upset stomach, liver function test abnormalities, itching, taste/smell disturbance, nausea, abdominal pain, and flatulence.  There is a rare possibility of liver failure that can occur when taking terbinafine.  The patient understands that a baseline LFT and kidney function test may be required. The patient verbalized understanding of the proper use and possible adverse effects of terbinafine.  All of the patient's questions and concerns were addressed. Erivedge Counseling- I discussed with the patient the risks of Erivedge including but not limited to nausea, vomiting, diarrhea, constipation, weight loss, changes in the sense of taste, decreased appetite, muscle spasms, and hair loss.  The patient verbalized understanding of the proper use and possible adverse effects of Erivedge.  All of the patient's questions and concerns were addressed. Glycopyrrolate Counseling:  I discussed with the patient the risks of glycopyrrolate including but not limited to skin rash, drowsiness, dry mouth, difficulty urinating, and blurred vision. Birth Control Pills Pregnancy And Lactation Text: This medication should be avoided if pregnant and for the first 30 days post-partum. Clindamycin Pregnancy And Lactation Text: This medication can be used in pregnancy if certain situations. Clindamycin is also present in breast milk. Infliximab Counseling:  I discussed with the patient the risks of infliximab including but not limited to myelosuppression, immunosuppression, autoimmune hepatitis, demyelinating diseases, lymphoma, and serious infections.  The patient understands that monitoring is required including a PPD at baseline and must alert us or the primary physician if symptoms of infection or other concerning signs are noted. Olanzapine Counseling- I discussed with the patient the common side effects of olanzapine including but are not limited to: lack of energy, dry mouth, increased appetite, sleepiness, tremor, constipation, dizziness, changes in behavior, or restlessness.  Explained that teenagers are more likely to experience headaches, abdominal pain, pain in the arms or legs, tiredness, and sleepiness.  Serious side effects include but are not limited: increased risk of death in elderly patients who are confused, have memory loss, or dementia-related psychosis; hyperglycemia; increased cholesterol and triglycerides; and weight gain. Calcipotriene Counseling:  I discussed with the patient the risks of calcipotriene including but not limited to erythema, scaling, itching, and irritation. Albendazole Counseling:  I discussed with the patient the risks of albendazole including but not limited to cytopenia, kidney damage, nausea/vomiting and severe allergy.  The patient understands that this medication is being used in an off-label manner. Protopic Pregnancy And Lactation Text: This medication is Pregnancy Category C. It is unknown if this medication is excreted in breast milk when applied topically. Quinolones Counseling:  I discussed with the patient the risks of fluoroquinolones including but not limited to GI upset, allergic reaction, drug rash, diarrhea, dizziness, photosensitivity, yeast infections, liver function test abnormalities, tendonitis/tendon rupture. Topical Metronidazole Pregnancy And Lactation Text: This medication is Pregnancy Category B and considered safe during pregnancy.  It is also considered safe to use while breastfeeding. Litfulo Counseling: I discussed with the patient the risks of Litfulo therapy including but not limited to upper respiratory tract infections, shingles, cold sores, and nausea. Live vaccines should be avoided.  This medication has been linked to serious infections; higher rate of mortality; malignancy and lymphoproliferative disorders; major adverse cardiovascular events; thrombosis; gastrointestinal perforations; neutropenia; lymphopenia; anemia; liver enzyme elevations; and lipid elevations. VTAMA Counseling: I discussed with the patient that VTAMA is not for use in the eyes, mouth or mouth. They should call the office if they develop any signs of allergic reactions to VTAMA. The patient verbalized understanding of the proper use and possible adverse effects of VTAMA.  All of the patient's questions and concerns were addressed. Olanzapine Pregnancy And Lactation Text: This medication is pregnancy category C.   There are no adequate and well controlled trials with olanzapine in pregnant females.  Olanzapine should be used during pregnancy only if the potential benefit justifies the potential risk to the fetus.   In a study in lactating healthy women, olanzapine was excreted in breast milk.  It is recommended that women taking olanzapine should not breast feed. Birth Control Pills Counseling: Birth Control Pill Counseling: I discussed with the patient the potential side effects of OCPs including but not limited to increased risk of stroke, heart attack, thrombophlebitis, deep venous thrombosis, hepatic adenomas, breast changes, GI upset, headaches, and depression.  The patient verbalized understanding of the proper use and possible adverse effects of OCPs. All of the patient's questions and concerns were addressed. Topical Retinoid counseling:  Patient advised to apply a pea-sized amount only at bedtime and wait 30 minutes after washing their face before applying.  If too drying, patient may add a non-comedogenic moisturizer. The patient verbalized understanding of the proper use and possible adverse effects of retinoids.  All of the patient's questions and concerns were addressed. Arava Counseling:  Patient counseled regarding adverse effects of Arava including but not limited to nausea, vomiting, abnormalities in liver function tests. Patients may develop mouth sores, rash, diarrhea, and abnormalities in blood counts. The patient understands that monitoring is required including LFTs and blood counts.  There is a rare possibility of scarring of the liver and lung problems that can occur when taking methotrexate. Persistent nausea, loss of appetite, pale stools, dark urine, cough, and shortness of breath should be reported immediately. Patient advised to discontinue Arava treatment and consult with a physician prior to attempting conception. The patient will have to undergo a treatment to eliminate Arava from the body prior to conception. Xelkitz Pregnancy And Lactation Text: This medication is Pregnancy Category D and is not considered safe during pregnancy.  The risk during breast feeding is also uncertain. Cyclophosphamide Pregnancy And Lactation Text: This medication is Pregnancy Category D and it isn't considered safe during pregnancy. This medication is excreted in breast milk. Include Pregnancy/Lactation Warning?: No SSKI Counseling:  I discussed with the patient the risks of SSKI including but not limited to thyroid abnormalities, metallic taste, GI upset, fever, headache, acne, arthralgias, paraesthesias, lymphadenopathy, easy bleeding, arrhythmias, and allergic reaction. Cosentyx Counseling:  I discussed with the patient the risks of Cosentyx including but not limited to worsening of Crohn's disease, immunosuppression, allergic reactions and infections.  The patient understands that monitoring is required including a PPD at baseline and must alert us or the primary physician if symptoms of infection or other concerning signs are noted. Ketoconazole Pregnancy And Lactation Text: This medication is Pregnancy Category C and it isn't know if it is safe during pregnancy. It is also excreted in breast milk and breast feeding isn't recommended. Fluconazole Counseling:  Patient counseled regarding adverse effects of fluconazole including but not limited to headache, diarrhea, nausea, upset stomach, liver function test abnormalities, taste disturbance, and stomach pain.  There is a rare possibility of liver failure that can occur when taking fluconazole.  The patient understands that monitoring of LFTs and kidney function test may be required, especially at baseline. The patient verbalized understanding of the proper use and possible adverse effects of fluconazole.  All of the patient's questions and concerns were addressed. Clindamycin Counseling: I counseled the patient regarding use of clindamycin as an antibiotic for prophylactic and/or therapeutic purposes. Clindamycin is active against numerous classes of bacteria, including skin bacteria. Side effects may include nausea, diarrhea, gastrointestinal upset, rash, hives, yeast infections, and in rare cases, colitis. Skyrizi Counseling: I discussed with the patient the risks of risankizumab-rzaa including but not limited to immunosuppression, and serious infections.  The patient understands that monitoring is required including a PPD at baseline and must alert us or the primary physician if symptoms of infection or other concerning signs are noted. Protopic Counseling: Patient may experience a mild burning sensation during topical application. Protopic is not approved in children less than 2 years of age. There have been case reports of hematologic and skin malignancies in patients using topical calcineurin inhibitors although causality is questionable. Hydroxychloroquine Counseling:  I discussed with the patient that a baseline ophthalmologic exam is needed at the start of therapy and every year thereafter while on therapy. A CBC may also be warranted for monitoring.  The side effects of this medication were discussed with the patient, including but not limited to agranulocytosis, aplastic anemia, seizures, rashes, retinopathy, and liver toxicity. Patient instructed to call the office should any adverse effect occur.  The patient verbalized understanding of the proper use and possible adverse effects of Plaquenil.  All the patient's questions and concerns were addressed. Topical Metronidazole Counseling: Metronidazole is a topical antibiotic medication. You may experience burning, stinging, redness, or allergic reactions.  Please call our office if you develop any problems from using this medication. Eucrisa Counseling: Patient may experience a mild burning sensation during topical application. Eucrisa is not approved in children less than 2 years of age. High Dose Vitamin A Pregnancy And Lactation Text: High dose vitamin A therapy is contraindicated during pregnancy and breast feeding. Winlevi Pregnancy And Lactation Text: This medication is considered safe during pregnancy and breastfeeding. Hydroxyzine Pregnancy And Lactation Text: This medication is not safe during pregnancy and should not be taken. It is also excreted in breast milk and breast feeding isn't recommended. Xolair Pregnancy And Lactation Text: This medication is Pregnancy Category B and is considered safe during pregnancy. This medication is excreted in breast milk. Soolantra Pregnancy And Lactation Text: This medication is Pregnancy Category C. This medication is considered safe during breast feeding. Cibinqo Pregnancy And Lactation Text: It is unknown if this medication will adversely affect pregnancy or breast feeding.  You should not take this medication if you are currently pregnant or planning a pregnancy or while breastfeeding. Xeljanz Counseling: I discussed with the patient the risks of Xeljanz therapy including increased risk of infection, liver issues, headache, diarrhea, or cold symptoms. Live vaccines should be avoided. They were instructed to call if they have any problems. Cimzia Pregnancy And Lactation Text: This medication crosses the placenta but can be considered safe in certain situations. Cimzia may be excreted in breast milk. Detail Level: Simple Propranolol Pregnancy And Lactation Text: This medication is Pregnancy Category C and it isn't known if it is safe during pregnancy. It is excreted in breast milk. Gabapentin Counseling: I discussed with the patient the risks of gabapentin including but not limited to dizziness, somnolence, fatigue and ataxia. Ketoconazole Counseling:   Patient counseled regarding improving absorption with orange juice.  Adverse effects include but are not limited to breast enlargement, headache, diarrhea, nausea, upset stomach, liver function test abnormalities, taste disturbance, and stomach pain.  There is a rare possibility of liver failure that can occur when taking ketoconazole. The patient understands that monitoring of LFTs may be required, especially at baseline. The patient verbalized understanding of the proper use and possible adverse effects of ketoconazole.  All of the patient's questions and concerns were addressed. Ilumya Counseling: I discussed with the patient the risks of tildrakizumab including but not limited to immunosuppression, malignancy, posterior leukoencephalopathy syndrome, and serious infections.  The patient understands that monitoring is required including a PPD at baseline and must alert us or the primary physician if symptoms of infection or other concerning signs are noted. Cyclophosphamide Counseling:  I discussed with the patient the risks of cyclophosphamide including but not limited to hair loss, hormonal abnormalities, decreased fertility, abdominal pain, diarrhea, nausea and vomiting, bone marrow suppression and infection. The patient understands that monitoring is required while taking this medication. Cephalexin Pregnancy And Lactation Text: This medication is Pregnancy Category B and considered safe during pregnancy.  It is also excreted in breast milk but can be used safely for shorter doses. Carac Counseling:  I discussed with the patient the risks of Carac including but not limited to erythema, scaling, itching, weeping, crusting, and pain. Opioid Pregnancy And Lactation Text: These medications can lead to premature delivery and should be avoided during pregnancy. These medications are also present in breast milk in small amounts. Hydroxychloroquine Pregnancy And Lactation Text: This medication has been shown to cause fetal harm but it isn't assigned a Pregnancy Risk Category. There are small amounts excreted in breast milk. Minocycline Counseling: Patient advised regarding possible photosensitivity and discoloration of the teeth, skin, lips, tongue and gums.  Patient instructed to avoid sunlight, if possible.  When exposed to sunlight, patients should wear protective clothing, sunglasses, and sunscreen.  The patient was instructed to call the office immediately if the following severe adverse effects occur:  hearing changes, easy bruising/bleeding, severe headache, or vision changes.  The patient verbalized understanding of the proper use and possible adverse effects of minocycline.  All of the patient's questions and concerns were addressed. Cibinqo Counseling: I discussed with the patient the risks of Cibinqo therapy including but not limited to common cold, nausea, headache, cold sores, increased blood CPK levels, dizziness, UTIs, fatigue, acne, and vomitting. Live vaccines should be avoided.  This medication has been linked to serious infections; higher rate of mortality; malignancy and lymphoproliferative disorders; major adverse cardiovascular events; thrombosis; thrombocytopenia and lymphopenia; lipid elevations; and retinal detachment. Winlevi Counseling:  I discussed with the patient the risks of topical clascoterone including but not limited to erythema, scaling, itching, and stinging. Patient voiced their understanding. High Dose Vitamin A Counseling: Side effects reviewed, pt to contact office should one occur. Minoxidil Counseling: Minoxidil is a topical medication which can increase blood flow where it is applied. It is uncertain how this medication increases hair growth. Side effects are uncommon and include stinging and allergic reactions. Xolair Counseling:  Patient informed of potential adverse effects including but not limited to fever, muscle aches, rash and allergic reactions.  The patient verbalized understanding of the proper use and possible adverse effects of Xolair.  All of the patient's questions and concerns were addressed. Hydroxyzine Counseling: Patient advised that the medication is sedating and not to drive a car after taking this medication.  Patient informed of potential adverse effects including but not limited to dry mouth, urinary retention, and blurry vision.  The patient verbalized understanding of the proper use and possible adverse effects of hydroxyzine.  All of the patient's questions and concerns were addressed. Soolantra Counseling: I discussed with the patients the risks of topial Soolantra. This is a medicine which decreases the number of mites and inflammation in the skin. You experience burning, stinging, eye irritation or allergic reactions.  Please call our office if you develop any problems from using this medication. Cimzia Counseling:  I discussed with the patient the risks of Cimzia including but not limited to immunosuppression, allergic reactions and infections.  The patient understands that monitoring is required including a PPD at baseline and must alert us or the primary physician if symptoms of infection or other concerning signs are noted. Sotyktu Pregnancy And Lactation Text: There is insufficient data to evaluate whether or not Sotyktu is safe to use during pregnancy.   It is not known if Sotyktu passes into breast milk and whether or not it is safe to use when breastfeeding.   Propranolol Counseling:  I discussed with the patient the risks of propranolol including but not limited to low heart rate, low blood pressure, low blood sugar, restlessness and increased cold sensitivity. They should call the office if they experience any of these side effects. Cephalexin Counseling: I counseled the patient regarding use of cephalexin as an antibiotic for prophylactic and/or therapeutic purposes. Cephalexin (commonly prescribed under brand name Keflex) is a cephalosporin antibiotic which is active against numerous classes of bacteria, including most skin bacteria. Side effects may include nausea, diarrhea, gastrointestinal upset, rash, hives, yeast infections, and in rare cases, hepatitis, kidney disease, seizures, fever, confusion, neurologic symptoms, and others. Patients with severe allergies to penicillin medications are cautioned that there is about a 10% incidence of cross-reactivity with cephalosporins. When possible, patients with penicillin allergies should use alternatives to cephalosporins for antibiotic therapy. Dapsone Pregnancy And Lactation Text: This medication is Pregnancy Category C and is not considered safe during pregnancy or breast feeding. Benzoyl Peroxide Pregnancy And Lactation Text: This medication is Pregnancy Category C. It is unknown if benzoyl peroxide is excreted in breast milk. Valtrex Pregnancy And Lactation Text: this medication is Pregnancy Category B and is considered safe during pregnancy. This medication is not directly found in breast milk but it's metabolite acyclovir is present. Elidel Counseling: Patient may experience a mild burning sensation during topical application. Elidel is not approved in children less than 2 years of age. There have been case reports of hematologic and skin malignancies in patients using topical calcineurin inhibitors although causality is questionable. Metronidazole Pregnancy And Lactation Text: This medication is Pregnancy Category B and considered safe during pregnancy.  It is also excreted in breast milk. Topical Ketoconazole Counseling: Patient counseled that this medication may cause skin irritation or allergic reactions.  In the event of skin irritation, the patient was advised to reduce the amount of the drug applied or use it less frequently.   The patient verbalized understanding of the proper use and possible adverse effects of ketoconazole.  All of the patient's questions and concerns were addressed. Isotretinoin Pregnancy And Lactation Text: This medication is Pregnancy Category X and is considered extremely dangerous during pregnancy. It is unknown if it is excreted in breast milk. Simponi Counseling:  I discussed with the patient the risks of golimumab including but not limited to myelosuppression, immunosuppression, autoimmune hepatitis, demyelinating diseases, lymphoma, and serious infections.  The patient understands that monitoring is required including a PPD at baseline and must alert us or the primary physician if symptoms of infection or other concerning signs are noted. Opioid Counseling: I discussed with the patient the potential side effects of opioids including but not limited to addiction, altered mental status, and depression. I stressed avoiding alcohol, benzodiazepines, muscle relaxants and sleep aids unless specifically okayed by a physician. The patient verbalized understanding of the proper use and possible adverse effects of opioids. All of the patient's questions and concerns were addressed. They were instructed to flush the remaining pills down the toilet if they did not need them for pain. Picato Counseling:  I discussed with the patient the risks of Picato including but not limited to erythema, scaling, itching, weeping, crusting, and pain. Low Dose Naltrexone Counseling- I discussed with the patient the potential risks and side effects of low dose naltrexone including but not limited to: more vivid dreams, headaches, nausea, vomiting, abdominal pain, fatigue, dizziness, and anxiety. Klisyri Pregnancy And Lactation Text: It is unknown if this medication can harm a developing fetus or if it is excreted in breast milk. Solaraze Pregnancy And Lactation Text: This medication is Pregnancy Category B and is considered safe. There is some data to suggest avoiding during the third trimester. It is unknown if this medication is excreted in breast milk. Doxepin Pregnancy And Lactation Text: This medication is Pregnancy Category C and it isn't known if it is safe during pregnancy. It is also excreted in breast milk and breast feeding isn't recommended. Sotyktu Counseling:  I discussed the most common side effects of Sotyktu including: common cold, sore throat, sinus infections, cold sores, canker sores, folliculitis, and acne.  I also discussed more serious side effects of Sotyktu including but not limited to: serious allergic reactions; increased risk for infections such as TB; cancers such as lymphomas; rhabdomyolysis and elevated CPK; and elevated triglycerides and liver enzymes.  Tetracycline Counseling: Patient counseled regarding possible photosensitivity and increased risk for sunburn.  Patient instructed to avoid sunlight, if possible.  When exposed to sunlight, patients should wear protective clothing, sunglasses, and sunscreen.  The patient was instructed to call the office immediately if the following severe adverse effects occur:  hearing changes, easy bruising/bleeding, severe headache, or vision changes.  The patient verbalized understanding of the proper use and possible adverse effects of tetracycline.  All of the patient's questions and concerns were addressed. Patient understands to avoid pregnancy while on therapy due to potential birth defects. Cellcept Counseling:  I discussed with the patient the risks of mycophenolate mofetil including but not limited to infection/immunosuppression, GI upset, hypokalemia, hypercholesterolemia, bone marrow suppression, lymphoproliferative disorders, malignancy, GI ulceration/bleed/perforation, colitis, interstitial lung disease, kidney failure, progressive multifocal leukoencephalopathy, and birth defects.  The patient understands that monitoring is required including a baseline creatinine and regular CBC testing. In addition, patient must alert us immediately if symptoms of infection or other concerning signs are noted. Itraconazole Counseling:  I discussed with the patient the risks of itraconazole including but not limited to liver damage, nausea/vomiting, neuropathy, and severe allergy.  The patient understands that this medication is best absorbed when taken with acidic beverages such as non-diet cola or ginger ale.  The patient understands that monitoring is required including baseline LFTs and repeat LFTs at intervals.  The patient understands that they are to contact us or the primary physician if concerning signs are noted. Adbry Pregnancy And Lactation Text: It is unknown if this medication will adversely affect pregnancy or breast feeding. Dapsone Counseling: I discussed with the patient the risks of dapsone including but not limited to hemolytic anemia, agranulocytosis, rashes, methemoglobinemia, kidney failure, peripheral neuropathy, headaches, GI upset, and liver toxicity.  Patients who start dapsone require monitoring including baseline LFTs and weekly CBCs for the first month, then every month thereafter.  The patient verbalized understanding of the proper use and possible adverse effects of dapsone.  All of the patient's questions and concerns were addressed. Benzoyl Peroxide Counseling: Patient counseled that medicine may cause skin irritation and bleach clothing.  In the event of skin irritation, the patient was advised to reduce the amount of the drug applied or use it less frequently.   The patient verbalized understanding of the proper use and possible adverse effects of benzoyl peroxide.  All of the patient's questions and concerns were addressed. Bactrim Pregnancy And Lactation Text: This medication is Pregnancy Category D and is known to cause fetal risk.  It is also excreted in breast milk. Valtrex Counseling: I discussed with the patient the risks of valacyclovir including but not limited to kidney damage, nausea, vomiting and severe allergy.  The patient understands that if the infection seems to be worsening or is not improving, they are to call. Humira Counseling:  I discussed with the patient the risks of adalimumab including but not limited to myelosuppression, immunosuppression, autoimmune hepatitis, demyelinating diseases, lymphoma, and serious infections.  The patient understands that monitoring is required including a PPD at baseline and must alert us or the primary physician if symptoms of infection or other concerning signs are noted. Metronidazole Counseling:  I discussed with the patient the risks of metronidazole including but not limited to seizures, nausea/vomiting, a metallic taste in the mouth, nausea/vomiting and severe allergy. Saxenda Counseling: I reviewed the possible side effects including: thyroid tumors, kidney disease, gallbladder disease, abdominal pain, constipation, diarrhea, nausea, vomiting and pancreatitis. Do not take this medication if you have a history or family history of multiple endocrine neoplasia syndrome type 2. Side effects reviewed, pt to contact office should one occur. Saxenda Pregnancy And Lactation Text: The fetal risk of this medication is unknown and taking while pregnant is not recommended. It is unknown if this medication is present in breast milk. Semaglutide Counseling: I reviewed the possible side effects including: thyroid tumors, kidney disease, gallbladder disease, abdominal pain, constipation, diarrhea, nausea, vomiting and pancreatitis. Do not take this medication if you have a history or family history of multiple endocrine neoplasia syndrome type 2. Side effects reviewed, pt to contact office should one occur. Ebglyss Counseling: I discussed with the patient the risks of lebrikizumab including but not limited to eye inflammation and irritation, cold sores, injection site reactions, allergic reactions and increased risk of parasitic infection. The patient understands that monitoring is required and they must alert us or the primary physician if symptoms of infection or other concerning signs are noted. Dutasteride Female Counseling: Dutasteride Counseling:  I discussed with the patient the risks of use of dutasteride including but not limited to decreased libido and sexual dysfunction. Explained the teratogenic nature of the medication and stressed the importance of not getting pregnant during treatment. All of the patient's questions and concerns were addressed. Ebglyss Pregnancy And Lactation Text: This medication likely crosses the placenta but the risk for the fetus is uncertain. It is unknown if this medication is excreted in breast milk. Bimzelx Pregnancy And Lactation Text: This medication crosses the placenta and the safety is uncertain during pregnancy. It is unknown if this medication is present in breast milk. Simlandi Counseling:  I discussed with the patient the risks of adalimumab including but not limited to myelosuppression, immunosuppression, autoimmune hepatitis, demyelinating diseases, lymphoma, and serious infections.  The patient understands that monitoring is required including a PPD at baseline and must alert us or the primary physician if symptoms of infection or other concerning signs are noted. Ozempic Counseling: I reviewed the possible side effects including: thyroid tumors, kidney disease, gallbladder disease, abdominal pain, constipation, diarrhea, nausea, vomiting and pancreatitis. Do not take this medication if you have a history or family history of multiple endocrine neoplasia syndrome type 2. Side effects reviewed, pt to contact office should one occur. Nemluvio Counseling: I discussed with the patient the risks of nemolizumab including but not limited to headache, gastrointestinal complaints, nasopharyngitis, musculoskeletal complaints, injection site reactions, and allergic reactions. The patient understands that monitoring is required and they must alert us or the primary physician if any side effects are noted. Nemluvio Pregnancy And Lactation Text: It is not known if Nemluvio causes fetal harm or is present in breast milk. Please proceed with caution if patients who are pregnant or breastfeeding. Bimzelx Counseling:  I discussed with the patient the risks of Bimzelx including but not limited to depression, immunosuppression, allergic reactions and infections.  The patient understands that monitoring is required including a PPD at baseline and must alert us or the primary physician if symptoms of infection or other concerning signs are noted. Wegovy Counseling: I reviewed the possible side effects including: thyroid tumors, kidney disease, gallbladder disease, abdominal pain, constipation, diarrhea, nausea, vomiting and pancreatitis. Do not take this medication if you have a history or family history of multiple endocrine neoplasia syndrome type 2. Side effects reviewed, pt to contact office should one occur. Spevigo Counseling: I discussed with the patient the risks of Spevigo including but not limited to fatigue, nasuea, vomiting, headache, pruritus, urinary tract infection, an infusion related reactions.  The patient understands that monitoring is required including screening for tuberculosis at baseline and yearly screening thereafter while continuing Spevigo therapy. They should contact us if symptoms of infection or other concerning signs are noted. Spevigo Pregnancy And Lactation Text: The risk during pregnancy and breastfeeding is uncertain with this medication. This medication does cross the placenta. It is unknown if this medication is found in breast milk. Zepbound Counseling: I reviewed the possible side effects including: thyroid tumors, kidney disease, gallbladder disease, abdominal pain, constipation, diarrhea, nausea, vomiting and pancreatitis. Do not take this medication if you have a history or family history of multiple endocrine neoplasia syndrome type 2. Side effects reviewed, pt to contact office should one occur. Finasteride Female Counseling: Finasteride Counseling:  I discussed with the patient the risks of use of finasteride including but not limited to decreased libido and sexual dysfunction. Explained the teratogenic nature of the medication and stressed the importance of not getting pregnant during treatment. All of the patient's questions and concerns were addressed. Hyrimoz Counseling:  I discussed with the patient the risks of adalimumab including but not limited to myelosuppression, immunosuppression, autoimmune hepatitis, demyelinating diseases, lymphoma, and serious infections.  The patient understands that monitoring is required including a PPD at baseline and must alert us or the primary physician if symptoms of infection or other concerning signs are noted.